# Patient Record
Sex: FEMALE | Race: WHITE | Employment: OTHER | URBAN - METROPOLITAN AREA
[De-identification: names, ages, dates, MRNs, and addresses within clinical notes are randomized per-mention and may not be internally consistent; named-entity substitution may affect disease eponyms.]

---

## 2021-02-09 PROBLEM — G57.02 PIRIFORMIS SYNDROME OF LEFT SIDE: Status: ACTIVE | Noted: 2021-02-09

## 2021-05-05 PROBLEM — M51.36 DDD (DEGENERATIVE DISC DISEASE), LUMBAR: Status: ACTIVE | Noted: 2021-05-05

## 2021-05-05 PROBLEM — M54.50 LUMBAR PAIN: Status: ACTIVE | Noted: 2021-05-05

## 2021-05-05 PROBLEM — M47.816 LUMBAR SPONDYLOSIS: Status: ACTIVE | Noted: 2021-05-05

## 2021-07-07 PROBLEM — M53.3 SACROILIAC JOINT DISEASE: Chronic | Status: ACTIVE | Noted: 2021-07-07

## 2022-09-26 PROBLEM — R25.2 BILATERAL LEG CRAMPS: Status: ACTIVE | Noted: 2022-09-26

## 2023-04-11 PROBLEM — I87.2 VENOUS REFLUX: Status: ACTIVE | Noted: 2023-04-11

## 2023-09-18 PROBLEM — N18.9 CHRONIC KIDNEY DISEASE: Chronic | Status: ACTIVE | Noted: 2023-09-18

## 2024-03-11 PROBLEM — I10 ESSENTIAL (PRIMARY) HYPERTENSION: Status: ACTIVE | Noted: 2023-03-21

## 2024-03-11 PROBLEM — I10 ESSENTIAL (PRIMARY) HYPERTENSION: Chronic | Status: ACTIVE | Noted: 2023-03-21

## 2024-05-11 ENCOUNTER — APPOINTMENT (OUTPATIENT)
Dept: GENERAL RADIOLOGY | Age: 89
DRG: 963 | End: 2024-05-11
Payer: MEDICARE

## 2024-05-11 ENCOUNTER — APPOINTMENT (OUTPATIENT)
Dept: CT IMAGING | Age: 89
DRG: 963 | End: 2024-05-11
Payer: MEDICARE

## 2024-05-11 ENCOUNTER — HOSPITAL ENCOUNTER (INPATIENT)
Age: 89
LOS: 4 days | Discharge: NURSING, REHAB OR LONG-TERM CARE | DRG: 963 | End: 2024-05-15
Attending: EMERGENCY MEDICINE | Admitting: SURGERY
Payer: MEDICARE

## 2024-05-11 DIAGNOSIS — S32.512A CLOSED FRACTURE OF SUPERIOR RAMUS OF LEFT PUBIS, INITIAL ENCOUNTER (HCC): ICD-10-CM
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
## 2024-05-11 DIAGNOSIS — S32.592A CLOSED FRACTURE OF LEFT INFERIOR PUBIC RAMUS, INITIAL ENCOUNTER (HCC): ICD-10-CM

## 2024-05-11 DIAGNOSIS — R79.89 ELEVATED TROPONIN: ICD-10-CM
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
## 2024-05-11 LAB
ALBUMIN SERPL-MCNC: 3.6 G/DL (ref 3.5–5.2)
ALBUMIN/GLOB SERPL: 2 {RATIO} (ref 1–2.5)
ALP SERPL-CCNC: 67 U/L (ref 35–104)
ALT SERPL-CCNC: 36 U/L (ref 10–35)
ANION GAP SERPL CALCULATED.3IONS-SCNC: 13 MMOL/L (ref 9–16)
APAP SERPL-MCNC: <5 UG/ML (ref 10–30)
AST SERPL-CCNC: 159 U/L (ref 10–35)
BACTERIA URNS QL MICRO: ABNORMAL
BASOPHILS # BLD: <0.03 K/UL (ref 0–0.2)
BASOPHILS NFR BLD: 0 % (ref 0–2)
BILIRUB SERPL-MCNC: 1.5 MG/DL (ref 0–1.2)
BILIRUB UR QL STRIP: NEGATIVE
BNP SERPL-MCNC: 1737 PG/ML (ref 0–300)
BUN BLD-MCNC: 57 MG/DL (ref 8–26)
BUN SERPL-MCNC: 54 MG/DL (ref 8–23)
CA-I BLD-SCNC: 1.23 MMOL/L (ref 1.13–1.33)
CA-I BLD-SCNC: 1.27 MMOL/L (ref 1.15–1.33)
CALCIUM SERPL-MCNC: 9.1 MG/DL (ref 8.6–10.4)
CASTS #/AREA URNS LPF: ABNORMAL /LPF (ref 0–8)
CHLORIDE BLD-SCNC: 108 MMOL/L (ref 98–107)
CHLORIDE SERPL-SCNC: 105 MMOL/L (ref 98–107)
CK SERPL-CCNC: 5058 U/L (ref 26–192)
CLARITY UR: ABNORMAL
CO2 BLD CALC-SCNC: 23 MMOL/L (ref 22–30)
CO2 SERPL-SCNC: 20 MMOL/L (ref 20–31)
COLOR UR: ABNORMAL
CORTIS SERPL-MCNC: 20.9 UG/DL (ref 2.5–19.5)
CREAT SERPL-MCNC: 1 MG/DL (ref 0.5–0.9)
EGFR, POC: 57 ML/MIN/1.73M2
EOSINOPHIL # BLD: 0.03 K/UL (ref 0–0.44)
EOSINOPHILS RELATIVE PERCENT: 0 % (ref 1–4)
EPI CELLS #/AREA URNS HPF: ABNORMAL /HPF (ref 0–5)
ERYTHROCYTE [DISTWIDTH] IN BLOOD BY AUTOMATED COUNT: 13.9 % (ref 11.8–14.4)
ETHANOL PERCENT: <0.01 %
ETHANOLAMINE SERPL-MCNC: <10 MG/DL
GFR, ESTIMATED: 49 ML/MIN/1.73M2
GLUCOSE BLD-MCNC: 111 MG/DL (ref 74–100)
GLUCOSE SERPL-MCNC: 111 MG/DL (ref 74–99)
GLUCOSE UR STRIP-MCNC: NEGATIVE MG/DL
HCO3 VENOUS: 23.5 MMOL/L (ref 22–29)
HCT VFR BLD AUTO: 24.1 % (ref 36.3–47.1)
HCT VFR BLD AUTO: 24.5 % (ref 36.3–47.1)
HCT VFR BLD AUTO: 26 % (ref 36–46)
HGB BLD-MCNC: 7.6 G/DL (ref 11.9–15.1)
HGB BLD-MCNC: 8.4 G/DL (ref 11.9–15.1)
HGB UR QL STRIP.AUTO: ABNORMAL
IMM GRANULOCYTES # BLD AUTO: 0.07 K/UL (ref 0–0.3)
IMM GRANULOCYTES NFR BLD: 1 %
INR PPP: 1.2
KETONES UR STRIP-MCNC: ABNORMAL MG/DL
LACTIC ACID, SEPSIS WHOLE BLOOD: 1.4 MMOL/L (ref 0.5–1.9)
LACTIC ACID, SEPSIS WHOLE BLOOD: 2 MMOL/L (ref 0.5–1.9)
LEUKOCYTE ESTERASE UR QL STRIP: ABNORMAL
LIPASE SERPL-CCNC: 13 U/L (ref 13–60)
LYMPHOCYTES NFR BLD: 0.93 K/UL (ref 1.1–3.7)
LYMPHOCYTES RELATIVE PERCENT: 8 % (ref 24–43)
MAGNESIUM SERPL-MCNC: 2.4 MG/DL (ref 1.7–2.3)
MCH RBC QN AUTO: 32.6 PG (ref 25.2–33.5)
MCHC RBC AUTO-ENTMCNC: 34.3 G/DL (ref 28.4–34.8)
MCV RBC AUTO: 95 FL (ref 82.6–102.9)
MONOCYTES NFR BLD: 1.12 K/UL (ref 0.1–1.2)
MONOCYTES NFR BLD: 10 % (ref 3–12)
MYOGLOBIN SERPL-MCNC: 4083 NG/ML (ref 25–58)
NEGATIVE BASE EXCESS, VEN: 2.3 MMOL/L (ref 0–2)
NEUTROPHILS NFR BLD: 81 % (ref 36–65)
NEUTS SEG NFR BLD: 9.61 K/UL (ref 1.5–8.1)
NITRITE UR QL STRIP: POSITIVE
NRBC BLD-RTO: 0 PER 100 WBC
O2 SAT, VEN: 39.2 % (ref 60–85)
PARTIAL THROMBOPLASTIN TIME: 33.6 SEC (ref 23–36.5)
PCO2, VEN: 44.2 MM HG (ref 41–51)
PH UR STRIP: 5 [PH] (ref 5–8)
PH VENOUS: 7.33 (ref 7.32–7.43)
PHOSPHATE SERPL-MCNC: 3.3 MG/DL (ref 2.5–4.5)
PLATELET # BLD AUTO: 111 K/UL (ref 138–453)
PMV BLD AUTO: 13 FL (ref 8.1–13.5)
PO2, VEN: 24.3 MM HG (ref 30–50)
POC ANION GAP: 8 MMOL/L (ref 7–16)
POC CREATININE: 0.9 MG/DL (ref 0.51–1.19)
POC HEMOGLOBIN (CALC): 8.8 G/DL (ref 12–16)
POC LACTIC ACID: 1.7 MMOL/L (ref 0.56–1.39)
POTASSIUM BLD-SCNC: 4.2 MMOL/L (ref 3.5–4.5)
POTASSIUM SERPL-SCNC: 4.3 MMOL/L (ref 3.7–5.3)
PROT SERPL-MCNC: 5.9 G/DL (ref 6.6–8.7)
PROT UR STRIP-MCNC: ABNORMAL MG/DL
PROTHROMBIN TIME: 14.8 SEC (ref 11.7–14.9)
RBC # BLD AUTO: 2.58 M/UL (ref 3.95–5.11)
RBC #/AREA URNS HPF: ABNORMAL /HPF (ref 0–4)
SALICYLATES SERPL-MCNC: <0.5 MG/DL (ref 0–10)
SODIUM BLD-SCNC: 138 MMOL/L (ref 138–146)
SODIUM SERPL-SCNC: 138 MMOL/L (ref 136–145)
SP GR UR STRIP: 1.02 (ref 1–1.03)
T4 FREE SERPL-MCNC: 1.6 NG/DL (ref 0.92–1.68)
TROPONIN I SERPL HS-MCNC: 47 NG/L (ref 0–14)
TROPONIN I SERPL HS-MCNC: 49 NG/L (ref 0–14)
TROPONIN I SERPL HS-MCNC: 80 NG/L (ref 0–14)
TSH SERPL DL<=0.05 MIU/L-ACNC: 8.27 UIU/ML (ref 0.27–4.2)
UROBILINOGEN UR STRIP-ACNC: NORMAL EU/DL (ref 0–1)
WBC #/AREA URNS HPF: ABNORMAL /HPF (ref 0–5)
WBC OTHER # BLD: 11.8 K/UL (ref 3.5–11.3)

## 2024-05-11 PROCEDURE — 2060000000 HC ICU INTERMEDIATE R&B

## 2024-05-11 PROCEDURE — 87040 BLOOD CULTURE FOR BACTERIA: CPT

## 2024-05-11 PROCEDURE — 83605 ASSAY OF LACTIC ACID: CPT

## 2024-05-11 PROCEDURE — 96375 TX/PRO/DX INJ NEW DRUG ADDON: CPT

## 2024-05-11 PROCEDURE — 81003 URINALYSIS AUTO W/O SCOPE: CPT

## 2024-05-11 PROCEDURE — 85610 PROTHROMBIN TIME: CPT

## 2024-05-11 PROCEDURE — 73110 X-RAY EXAM OF WRIST: CPT

## 2024-05-11 PROCEDURE — 84439 ASSAY OF FREE THYROXINE: CPT

## 2024-05-11 PROCEDURE — 83690 ASSAY OF LIPASE: CPT

## 2024-05-11 PROCEDURE — 99284 EMERGENCY DEPT VISIT MOD MDM: CPT | Performed by: SURGERY

## 2024-05-11 PROCEDURE — 96365 THER/PROPH/DIAG IV INF INIT: CPT

## 2024-05-11 PROCEDURE — 82330 ASSAY OF CALCIUM: CPT

## 2024-05-11 PROCEDURE — 80051 ELECTROLYTE PANEL: CPT

## 2024-05-11 PROCEDURE — 82533 TOTAL CORTISOL: CPT

## 2024-05-11 PROCEDURE — 2720000011 HC SANE KIT SUPPLY STERILE

## 2024-05-11 PROCEDURE — 73080 X-RAY EXAM OF ELBOW: CPT

## 2024-05-11 PROCEDURE — 84100 ASSAY OF PHOSPHORUS: CPT

## 2024-05-11 PROCEDURE — 99285 EMERGENCY DEPT VISIT HI MDM: CPT

## 2024-05-11 PROCEDURE — 85014 HEMATOCRIT: CPT

## 2024-05-11 PROCEDURE — 84443 ASSAY THYROID STIM HORMONE: CPT

## 2024-05-11 PROCEDURE — 85025 COMPLETE CBC W/AUTO DIFF WBC: CPT

## 2024-05-11 PROCEDURE — 6360000004 HC RX CONTRAST MEDICATION: Performed by: STUDENT IN AN ORGANIZED HEALTH CARE EDUCATION/TRAINING PROGRAM

## 2024-05-11 PROCEDURE — 73562 X-RAY EXAM OF KNEE 3: CPT

## 2024-05-11 PROCEDURE — 84484 ASSAY OF TROPONIN QUANT: CPT

## 2024-05-11 PROCEDURE — 82803 BLOOD GASES ANY COMBINATION: CPT

## 2024-05-11 PROCEDURE — 83874 ASSAY OF MYOGLOBIN: CPT

## 2024-05-11 PROCEDURE — 85018 HEMOGLOBIN: CPT

## 2024-05-11 PROCEDURE — 93005 ELECTROCARDIOGRAM TRACING: CPT | Performed by: STUDENT IN AN ORGANIZED HEALTH CARE EDUCATION/TRAINING PROGRAM

## 2024-05-11 PROCEDURE — 85730 THROMBOPLASTIN TIME PARTIAL: CPT

## 2024-05-11 PROCEDURE — 81001 URINALYSIS AUTO W/SCOPE: CPT

## 2024-05-11 PROCEDURE — 96367 TX/PROPH/DG ADDL SEQ IV INF: CPT

## 2024-05-11 PROCEDURE — 87088 URINE BACTERIA CULTURE: CPT

## 2024-05-11 PROCEDURE — 70450 CT HEAD/BRAIN W/O DYE: CPT

## 2024-05-11 PROCEDURE — 80179 DRUG ASSAY SALICYLATE: CPT

## 2024-05-11 PROCEDURE — 80143 DRUG ASSAY ACETAMINOPHEN: CPT

## 2024-05-11 PROCEDURE — 84520 ASSAY OF UREA NITROGEN: CPT

## 2024-05-11 PROCEDURE — 93005 ELECTROCARDIOGRAM TRACING: CPT

## 2024-05-11 PROCEDURE — 71045 X-RAY EXAM CHEST 1 VIEW: CPT

## 2024-05-11 PROCEDURE — 2580000003 HC RX 258: Performed by: STUDENT IN AN ORGANIZED HEALTH CARE EDUCATION/TRAINING PROGRAM

## 2024-05-11 PROCEDURE — 80053 COMPREHEN METABOLIC PANEL: CPT

## 2024-05-11 PROCEDURE — 73610 X-RAY EXAM OF ANKLE: CPT

## 2024-05-11 PROCEDURE — 36415 COLL VENOUS BLD VENIPUNCTURE: CPT

## 2024-05-11 PROCEDURE — 2580000003 HC RX 258

## 2024-05-11 PROCEDURE — G0480 DRUG TEST DEF 1-7 CLASSES: HCPCS

## 2024-05-11 PROCEDURE — 87186 SC STD MICRODIL/AGAR DIL: CPT

## 2024-05-11 PROCEDURE — 73030 X-RAY EXAM OF SHOULDER: CPT

## 2024-05-11 PROCEDURE — 83735 ASSAY OF MAGNESIUM: CPT

## 2024-05-11 PROCEDURE — 6360000002 HC RX W HCPCS: Performed by: STUDENT IN AN ORGANIZED HEALTH CARE EDUCATION/TRAINING PROGRAM

## 2024-05-11 PROCEDURE — 72125 CT NECK SPINE W/O DYE: CPT

## 2024-05-11 PROCEDURE — 82565 ASSAY OF CREATININE: CPT

## 2024-05-11 PROCEDURE — 82550 ASSAY OF CK (CPK): CPT

## 2024-05-11 PROCEDURE — 71260 CT THORAX DX C+: CPT

## 2024-05-11 PROCEDURE — 83880 ASSAY OF NATRIURETIC PEPTIDE: CPT

## 2024-05-11 PROCEDURE — 73060 X-RAY EXAM OF HUMERUS: CPT

## 2024-05-11 PROCEDURE — 82947 ASSAY GLUCOSE BLOOD QUANT: CPT

## 2024-05-11 PROCEDURE — 72190 X-RAY EXAM OF PELVIS: CPT

## 2024-05-11 PROCEDURE — 87086 URINE CULTURE/COLONY COUNT: CPT

## 2024-05-11 RX ORDER — ACETAMINOPHEN 500 MG
1000 TABLET ORAL EVERY 8 HOURS
Status: DISCONTINUED | OUTPATIENT
Start: 2024-05-11 | End: 2024-05-15 | Stop reason: HOSPADM

## 2024-05-11 RX ORDER — LOSARTAN POTASSIUM 50 MG/1
50 TABLET ORAL DAILY
Status: DISCONTINUED | OUTPATIENT
Start: 2024-05-11 | End: 2024-05-15 | Stop reason: HOSPADM

## 2024-05-11 RX ORDER — ONDANSETRON 4 MG/1
4 TABLET, ORALLY DISINTEGRATING ORAL EVERY 8 HOURS PRN
Status: DISCONTINUED | OUTPATIENT
Start: 2024-05-11 | End: 2024-05-15 | Stop reason: HOSPADM

## 2024-05-11 RX ORDER — SODIUM CHLORIDE 0.9 % (FLUSH) 0.9 %
5-40 SYRINGE (ML) INJECTION PRN
Status: DISCONTINUED | OUTPATIENT
Start: 2024-05-11 | End: 2024-05-15 | Stop reason: HOSPADM

## 2024-05-11 RX ORDER — OXYCODONE HYDROCHLORIDE 5 MG/1
5 TABLET ORAL EVERY 6 HOURS PRN
Status: DISCONTINUED | OUTPATIENT
Start: 2024-05-11 | End: 2024-05-11

## 2024-05-11 RX ORDER — SODIUM CHLORIDE 0.9 % (FLUSH) 0.9 %
5-40 SYRINGE (ML) INJECTION EVERY 12 HOURS SCHEDULED
Status: DISCONTINUED | OUTPATIENT
Start: 2024-05-11 | End: 2024-05-15 | Stop reason: HOSPADM

## 2024-05-11 RX ORDER — OXYCODONE HYDROCHLORIDE 5 MG/1
2.5 TABLET ORAL EVERY 6 HOURS PRN
Status: DISCONTINUED | OUTPATIENT
Start: 2024-05-11 | End: 2024-05-15 | Stop reason: HOSPADM

## 2024-05-11 RX ORDER — SODIUM CHLORIDE 9 MG/ML
INJECTION, SOLUTION INTRAVENOUS PRN
Status: DISCONTINUED | OUTPATIENT
Start: 2024-05-11 | End: 2024-05-15 | Stop reason: HOSPADM

## 2024-05-11 RX ORDER — METHOCARBAMOL 500 MG/1
500 TABLET, FILM COATED ORAL EVERY 8 HOURS
Status: DISCONTINUED | OUTPATIENT
Start: 2024-05-11 | End: 2024-05-11

## 2024-05-11 RX ORDER — GABAPENTIN 100 MG/1
100 CAPSULE ORAL EVERY 8 HOURS
Status: DISCONTINUED | OUTPATIENT
Start: 2024-05-11 | End: 2024-05-11

## 2024-05-11 RX ORDER — POLYETHYLENE GLYCOL 3350 17 G/17G
17 POWDER, FOR SOLUTION ORAL DAILY
Status: DISCONTINUED | OUTPATIENT
Start: 2024-05-11 | End: 2024-05-15 | Stop reason: HOSPADM

## 2024-05-11 RX ORDER — SPIRONOLACTONE 25 MG/1
12.5 TABLET ORAL DAILY
Status: DISCONTINUED | OUTPATIENT
Start: 2024-05-12 | End: 2024-05-15 | Stop reason: HOSPADM

## 2024-05-11 RX ORDER — LEVOTHYROXINE SODIUM 0.05 MG/1
50 TABLET ORAL DAILY
Status: DISCONTINUED | OUTPATIENT
Start: 2024-05-11 | End: 2024-05-15 | Stop reason: HOSPADM

## 2024-05-11 RX ORDER — SPIRONOLACTONE 25 MG/1
12.5 TABLET ORAL DAILY
Status: DISCONTINUED | OUTPATIENT
Start: 2024-05-11 | End: 2024-05-11

## 2024-05-11 RX ORDER — ONDANSETRON 2 MG/ML
4 INJECTION INTRAMUSCULAR; INTRAVENOUS EVERY 6 HOURS PRN
Status: DISCONTINUED | OUTPATIENT
Start: 2024-05-11 | End: 2024-05-15 | Stop reason: HOSPADM

## 2024-05-11 RX ORDER — 0.9 % SODIUM CHLORIDE 0.9 %
1000 INTRAVENOUS SOLUTION INTRAVENOUS ONCE
Status: COMPLETED | OUTPATIENT
Start: 2024-05-11 | End: 2024-05-11

## 2024-05-11 RX ORDER — MORPHINE SULFATE 4 MG/ML
2 INJECTION, SOLUTION INTRAMUSCULAR; INTRAVENOUS ONCE
Status: COMPLETED | OUTPATIENT
Start: 2024-05-11 | End: 2024-05-11

## 2024-05-11 RX ORDER — SODIUM CHLORIDE, SODIUM LACTATE, POTASSIUM CHLORIDE, CALCIUM CHLORIDE 600; 310; 30; 20 MG/100ML; MG/100ML; MG/100ML; MG/100ML
INJECTION, SOLUTION INTRAVENOUS CONTINUOUS
Status: DISCONTINUED | OUTPATIENT
Start: 2024-05-11 | End: 2024-05-15

## 2024-05-11 RX ADMIN — IOPAMIDOL 75 ML: 755 INJECTION, SOLUTION INTRAVENOUS at 14:09

## 2024-05-11 RX ADMIN — SODIUM CHLORIDE 1000 ML: 9 INJECTION, SOLUTION INTRAVENOUS at 13:04

## 2024-05-11 RX ADMIN — PIPERACILLIN AND TAZOBACTAM 3375 MG: 3; .375 INJECTION, POWDER, LYOPHILIZED, FOR SOLUTION INTRAVENOUS at 13:56

## 2024-05-11 RX ADMIN — MORPHINE SULFATE 2 MG: 4 INJECTION INTRAVENOUS at 13:09

## 2024-05-11 RX ADMIN — VANCOMYCIN HYDROCHLORIDE 1000 MG: 1 INJECTION, POWDER, LYOPHILIZED, FOR SOLUTION INTRAVENOUS at 15:02

## 2024-05-11 RX ADMIN — SODIUM CHLORIDE, POTASSIUM CHLORIDE, SODIUM LACTATE AND CALCIUM CHLORIDE: 600; 310; 30; 20 INJECTION, SOLUTION INTRAVENOUS at 19:03

## 2024-05-11 ASSESSMENT — PAIN SCALES - GENERAL
PAINLEVEL_OUTOF10: 5
PAINLEVEL_OUTOF10: 6

## 2024-05-11 ASSESSMENT — PAIN DESCRIPTION - LOCATION: LOCATION: BACK

## 2024-05-11 ASSESSMENT — PAIN DESCRIPTION - ORIENTATION: ORIENTATION: LOWER

## 2024-05-11 ASSESSMENT — PAIN - FUNCTIONAL ASSESSMENT: PAIN_FUNCTIONAL_ASSESSMENT: 0-10

## 2024-05-11 ASSESSMENT — PAIN DESCRIPTION - DESCRIPTORS: DESCRIPTORS: ACHING
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
## 2024-05-11 NOTE — PROGRESS NOTES
[facility]     Emergency/Trauma Note    PATIENT NAME: Genie Wisdom    Shift date: 5/11/2024  Shift day: Saturday   Shift # 2    Room # 0434/0434-01   Name: Genie Wisdom            Age: 89 y.o.  Gender: female          Adventist: Mu-ism   Place of [place]:     Trauma/Incident type: Adult Trauma Consult  Admit Date & Time: 5/11/2024 12:37 PM  TRAUMA NAME:     ADVANCE DIRECTIVES IN CHART?  No    NAME OF DECISION MAKER: N/A    RELATIONSHIP OF DECISION MAKER TO PATIENT: N/A    PATIENT/EVENT DESCRIPTION:  Genie Wisdom is a 89 y.o. female who arrived at [facility]. Writer responded to trauma consult to ED 34. Pt to be admitted to 0434/0434-01.         SPIRITUAL ASSESSMENT-INTERVENTION-OUTCOME:   was a ministry of presence to medical staff of several attempts to room. Upon returning, writer introduced self as . Patient did not appear to mind  presence and engaged in conversation about her health.  provided a supportive presence through active listening and words of affirmation. Patient stated family is aware she is on campus.      PATIENT BELONGINGS:  Writer did not handle patient belongings    ANY BELONGINGS OF SIGNIFICANT VALUE NOTED:  N/a    REGISTRATION STAFF NOTIFIED?  Yes      WHAT IS YOUR SPIRITUAL CARE PLAN FOR THIS PATIENT?:   Chaplains will remain available to offer spiritual and emotional support as needed.    Electronically signed by Chaplain Omid, on 5/11/2024 at 7:09 PM.  [facility]  [phone]

## 2024-05-11 NOTE — PROGRESS NOTES
15 Variable Trauma-Specific Frailty Index   Comorbidities   Cancer History Yes (1) No (0)   Coronary Heart Disease MI (1) CABG (0.75) Mild (0.25)  No (0)   Dementia Severe (1) Moderate (0.5) Mild (0.25)  No (0)   Daily Activities   Help With Grooming Yes (1) No (0)   Help with Managing Money Yes (1) No (0)   Help doing Housework Yes (1) No (0)   Help with Toileting Yes (1) No (0)   Help Walking Wheelchair (1) Walker (0.75) Cane (0.5) No (0)   Health Attitude   Feel Less Useful Most Time (1) Sometimes (0.5) Never (0)   Feel Sad Most Time (1) Sometimes (0.5) Never (0)   Feel Effort to Do Everything Most Time (1) Sometimes (0.5) Never (0)   Feels Lonely Most Time (1) Sometimes (0.5) Never (0)   Falls Most Time (1) Sometimes (0.5) Never (0)   Function   Sexually Active Yes (0)  No(1)   Nutrition   Albumin < 3g/dL (1)  > 3g/dL   Scoring   Score  4.75 FI (Score/15)   > 0.25 = Frail   *Based on 2-weeks prior to hospital admission   Trauma Specific Fraility Index > or = to 4 (4/15 = 0.26)  Trauma Specific Fraility Index Score:    Frail
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
## 2024-05-11 NOTE — ED NOTES
Admitting team paged and notified patient failed swallow study done by Amber STEVE. Awaiting order changes, will not give oral medications d/t fail
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
## 2024-05-11 NOTE — ED PROVIDER NOTES
Faculty Sign-Out Attestation  Handoff taken on the following patient from prior Attending Physician: Cheli    Note Started: 3:32 PM EDT    I was available and discussed any additional care issues that arose and coordinated the management plans with the resident(s) caring for the patient during my duty period. Any areas of disagreement with resident’s documentation of care or procedures are noted on the chart. I was personally present for the key portions of any/all procedures during my duty period. I have documented in the chart those procedures where I was not present during the key portions.    CT HEAD WO CONTRAST   Preliminary Result   CT HEAD: No evidence for acute intracranial pathology.      Findings likely reflecting chronic small vessel ischemic change.      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      CT CERVICAL SPINE: No acute abnormality of the cervical spine.      Multilevel degenerative changes.         CT CERVICAL SPINE WO CONTRAST   Preliminary Result   CT HEAD: No evidence for acute intracranial pathology.      Findings likely reflecting chronic small vessel ischemic change.      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      CT CERVICAL SPINE: No acute abnormality of the cervical spine.      Multilevel degenerative changes.         CT CHEST ABDOMEN PELVIS W CONTRAST Additional Contrast? None   Final Result   1. Acute fractures of the left superior and inferior pubic rami with an   associated 8.7 x 4.1 cm hematoma along the left acetabulum.   2. No evidence of traumatic injury in the chest.   3. 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   4. Large amount of stool in the rectum. Correlate for impaction.   5. No solid or hollow organ injury in the abdomen/pelvis.         CT THORACIC SPINE BONY RECONSTRUCTION   Preliminary Result   No evidence for acute fracture to the thoracic
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
## 2024-05-11 NOTE — ED NOTES
Unable to obtain accurate pulse ox on arrival pt core temp 32.7. Visible bruising to left arm and back. MD notified.
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
## 2024-05-12 LAB
25(OH)D3 SERPL-MCNC: 11.8 NG/ML (ref 30–100)
ANION GAP SERPL CALCULATED.3IONS-SCNC: 9 MMOL/L (ref 9–16)
BASOPHILS # BLD: <0.03 K/UL (ref 0–0.2)
BASOPHILS NFR BLD: 0 % (ref 0–2)
BUN SERPL-MCNC: 48 MG/DL (ref 8–23)
CALCIUM SERPL-MCNC: 8.4 MG/DL (ref 8.6–10.4)
CHLORIDE SERPL-SCNC: 110 MMOL/L (ref 98–107)
CK SERPL-CCNC: 2588 U/L (ref 26–192)
CK SERPL-CCNC: 4138 U/L (ref 26–192)
CO2 SERPL-SCNC: 19 MMOL/L (ref 20–31)
CREAT SERPL-MCNC: 1.1 MG/DL (ref 0.5–0.9)
EOSINOPHIL # BLD: 0.06 K/UL (ref 0–0.44)
EOSINOPHILS RELATIVE PERCENT: 1 % (ref 1–4)
ERYTHROCYTE [DISTWIDTH] IN BLOOD BY AUTOMATED COUNT: 14.6 % (ref 11.8–14.4)
GFR, ESTIMATED: 45 ML/MIN/1.73M2
GLUCOSE SERPL-MCNC: 95 MG/DL (ref 74–99)
HCT VFR BLD AUTO: 25.1 % (ref 36.3–47.1)
HGB BLD-MCNC: 7.9 G/DL (ref 11.9–15.1)
IMM GRANULOCYTES # BLD AUTO: 0.09 K/UL (ref 0–0.3)
IMM GRANULOCYTES NFR BLD: 1 %
LYMPHOCYTES NFR BLD: 1.01 K/UL (ref 1.1–3.7)
LYMPHOCYTES RELATIVE PERCENT: 12 % (ref 24–43)
MCH RBC QN AUTO: 31.9 PG (ref 25.2–33.5)
MCHC RBC AUTO-ENTMCNC: 31.5 G/DL (ref 28.4–34.8)
MCV RBC AUTO: 101.2 FL (ref 82.6–102.9)
MICROORGANISM SPEC CULT: ABNORMAL
MONOCYTES NFR BLD: 0.99 K/UL (ref 0.1–1.2)
MONOCYTES NFR BLD: 11 % (ref 3–12)
MYOGLOBIN SERPL-MCNC: 2213 NG/ML (ref 25–58)
MYOGLOBIN SERPL-MCNC: 868 NG/ML (ref 25–58)
NEUTROPHILS NFR BLD: 75 % (ref 36–65)
NEUTS SEG NFR BLD: 6.64 K/UL (ref 1.5–8.1)
NRBC BLD-RTO: 0 PER 100 WBC
PLATELET # BLD AUTO: 113 K/UL (ref 138–453)
PMV BLD AUTO: 12.5 FL (ref 8.1–13.5)
POTASSIUM SERPL-SCNC: 4.2 MMOL/L (ref 3.7–5.3)
RBC # BLD AUTO: 2.48 M/UL (ref 3.95–5.11)
RBC # BLD: ABNORMAL 10*6/UL
SODIUM SERPL-SCNC: 138 MMOL/L (ref 136–145)
SPECIMEN DESCRIPTION: ABNORMAL
TROPONIN I SERPL HS-MCNC: 124 NG/L (ref 0–14)
TROPONIN I SERPL HS-MCNC: 124 NG/L (ref 0–14)
TROPONIN I SERPL HS-MCNC: 83 NG/L (ref 0–14)
TROPONIN I SERPL HS-MCNC: 95 NG/L (ref 0–14)
WBC OTHER # BLD: 8.8 K/UL (ref 3.5–11.3)

## 2024-05-12 PROCEDURE — 97162 PT EVAL MOD COMPLEX 30 MIN: CPT

## 2024-05-12 PROCEDURE — 6370000000 HC RX 637 (ALT 250 FOR IP): Performed by: NURSE PRACTITIONER

## 2024-05-12 PROCEDURE — 2580000003 HC RX 258

## 2024-05-12 PROCEDURE — 97167 OT EVAL HIGH COMPLEX 60 MIN: CPT

## 2024-05-12 PROCEDURE — 97530 THERAPEUTIC ACTIVITIES: CPT

## 2024-05-12 PROCEDURE — 83874 ASSAY OF MYOGLOBIN: CPT

## 2024-05-12 PROCEDURE — 84484 ASSAY OF TROPONIN QUANT: CPT

## 2024-05-12 PROCEDURE — 2060000000 HC ICU INTERMEDIATE R&B

## 2024-05-12 PROCEDURE — 36415 COLL VENOUS BLD VENIPUNCTURE: CPT

## 2024-05-12 PROCEDURE — 82550 ASSAY OF CK (CPK): CPT

## 2024-05-12 PROCEDURE — 80048 BASIC METABOLIC PNL TOTAL CA: CPT

## 2024-05-12 PROCEDURE — 6360000002 HC RX W HCPCS: Performed by: NURSE PRACTITIONER

## 2024-05-12 PROCEDURE — 2580000003 HC RX 258: Performed by: STUDENT IN AN ORGANIZED HEALTH CARE EDUCATION/TRAINING PROGRAM

## 2024-05-12 PROCEDURE — 82306 VITAMIN D 25 HYDROXY: CPT

## 2024-05-12 PROCEDURE — 99223 1ST HOSP IP/OBS HIGH 75: CPT | Performed by: NURSE PRACTITIONER

## 2024-05-12 PROCEDURE — 85025 COMPLETE CBC W/AUTO DIFF WBC: CPT

## 2024-05-12 PROCEDURE — 92610 EVALUATE SWALLOWING FUNCTION: CPT

## 2024-05-12 PROCEDURE — 6370000000 HC RX 637 (ALT 250 FOR IP)

## 2024-05-12 PROCEDURE — 6360000002 HC RX W HCPCS: Performed by: STUDENT IN AN ORGANIZED HEALTH CARE EDUCATION/TRAINING PROGRAM

## 2024-05-12 PROCEDURE — 99231 SBSQ HOSP IP/OBS SF/LOW 25: CPT | Performed by: SURGERY

## 2024-05-12 RX ORDER — ERGOCALCIFEROL 1.25 MG/1
50000 CAPSULE ORAL WEEKLY
Status: DISCONTINUED | OUTPATIENT
Start: 2024-05-12 | End: 2024-05-15 | Stop reason: HOSPADM

## 2024-05-12 RX ORDER — HEPARIN SODIUM 5000 [USP'U]/ML
5000 INJECTION, SOLUTION INTRAVENOUS; SUBCUTANEOUS EVERY 8 HOURS SCHEDULED
Status: DISCONTINUED | OUTPATIENT
Start: 2024-05-12 | End: 2024-05-15 | Stop reason: HOSPADM

## 2024-05-12 RX ORDER — SODIUM CHLORIDE, SODIUM LACTATE, POTASSIUM CHLORIDE, AND CALCIUM CHLORIDE .6; .31; .03; .02 G/100ML; G/100ML; G/100ML; G/100ML
500 INJECTION, SOLUTION INTRAVENOUS ONCE
Status: COMPLETED | OUTPATIENT
Start: 2024-05-12 | End: 2024-05-12

## 2024-05-12 RX ADMIN — SODIUM CHLORIDE, PRESERVATIVE FREE 10 ML: 5 INJECTION INTRAVENOUS at 08:48

## 2024-05-12 RX ADMIN — ACETAMINOPHEN 1000 MG: 500 TABLET ORAL at 16:00

## 2024-05-12 RX ADMIN — ERGOCALCIFEROL 50000 UNITS: 1.25 CAPSULE ORAL at 16:00

## 2024-05-12 RX ADMIN — Medication 1000 MG: at 13:56

## 2024-05-12 RX ADMIN — SODIUM CHLORIDE, POTASSIUM CHLORIDE, SODIUM LACTATE AND CALCIUM CHLORIDE: 600; 310; 30; 20 INJECTION, SOLUTION INTRAVENOUS at 02:54

## 2024-05-12 RX ADMIN — ACETAMINOPHEN 1000 MG: 500 TABLET ORAL at 08:30

## 2024-05-12 RX ADMIN — LEVOTHYROXINE SODIUM 50 MCG: 50 TABLET ORAL at 08:47

## 2024-05-12 RX ADMIN — SODIUM CHLORIDE, POTASSIUM CHLORIDE, SODIUM LACTATE AND CALCIUM CHLORIDE: 600; 310; 30; 20 INJECTION, SOLUTION INTRAVENOUS at 10:52

## 2024-05-12 RX ADMIN — SODIUM CHLORIDE, POTASSIUM CHLORIDE, SODIUM LACTATE AND CALCIUM CHLORIDE 500 ML: 600; 310; 30; 20 INJECTION, SOLUTION INTRAVENOUS at 21:30

## 2024-05-12 RX ADMIN — ACETAMINOPHEN 1000 MG: 500 TABLET ORAL at 08:47

## 2024-05-12 RX ADMIN — HEPARIN SODIUM 5000 UNITS: 5000 INJECTION INTRAVENOUS; SUBCUTANEOUS at 21:33

## 2024-05-12 RX ADMIN — LEVOTHYROXINE SODIUM 50 MCG: 50 TABLET ORAL at 08:31

## 2024-05-12 NOTE — PROGRESS NOTES
PROGRESS NOTE    PATIENT NAME: Genie Wisdom  MEDICAL RECORD NO. [id number]  DATE: 5/12/2024    HD: # 1      DIAGNOSIS AND PLAN    Left superior and inferior pubic rami fractures with associated 8.7 x 4.1 cm hematoma along the left acetabulum   Orthopedic surgery following - non-operative management   Protected weightbearing as tolerated with walker   Post mobilization films    Monitor hemoglobin   Elevated troponin   Continue to monitor   Cardiology consulted - recommended heparin drip but held at this time due to pelvic hematoma   + Frailty - follow up geriatric medicine recommendations  MMPT   Maintenance IVF   Monitor intake and output   PT/OT       Chief Complaint: \"I suppose I am okay\"    SUBJECTIVE  Patient seen and examined at bedside.  No acute events overnight.  VSS, afebrile. Pain currently controlled.     OBJECTIVE  VITALS:   Vitals:    05/12/24 0725   BP: 117/73   Pulse: (!) 101   Resp: 14   Temp: 97.9 °F (36.6 °C)   SpO2: 100%     Physical Exam  Constitutional:       General: She is not in acute distress.  HENT:      Head: Normocephalic and atraumatic.      Right Ear: External ear normal.      Mouth/Throat:      Mouth: Mucous membranes are moist.   Eyes:      Extraocular Movements: Extraocular movements intact.   Cardiovascular:      Rate and Rhythm: Normal rate.   Pulmonary:      Effort: Pulmonary effort is normal. No respiratory distress.   Abdominal:      General: There is no distension.      Palpations: Abdomen is soft.      Tenderness: There is no abdominal tenderness.   Musculoskeletal:      Cervical back: Neck supple.   Skin:     General: Skin is warm and dry.      Findings: Bruising (bilateral lower extremities) present.   Neurological:      Mental Status: She is alert.   Psychiatric:         Mood and Affect: Mood normal.         Behavior: Behavior normal.           LAB:  CBC:   Recent Labs     05/11/24  1306 05/11/24  2253 05/12/24  0417   WBC 11.8*  --  8.8   HGB 8.4* 7.6* 7.9*   HCT 24.5*  lesions.  No bony erosions or bony destructive changes. Mild degenerative changes to the medial compartment.  No knee joint effusion. No acute soft tissue abnormalities. LEFT KNEE: Osteopenia. No fractures or dislocations.  No suspicious focal bony lesions.  No bony erosions or bony destructive changes. Mild degenerative changes to the medial compartment.  No knee joint effusion. No acute soft tissue abnormalities. Varicosities to the posterior distal left thigh. RIGHT ANKLE: Overlying items external to the patient somewhat limit evaluation. Osteopenia. No fractures or dislocations.  No suspicious focal bony lesions.  No bony erosions or bony destructive changes. No degenerative changes noted.  Ankle mortise relationships are maintained. No acute soft tissue abnormalities. LEFT ANKLE: Overlying items external to the patient somewhat limit evaluation. Osteopenia. No fractures or dislocations.  No suspicious focal bony lesions.  No bony erosions or bony destructive changes. No degenerative changes noted.  Ankle mortise relationships are maintained. No acute soft tissue abnormalities.     Studies of the left elbow, left wrist, both knees and both ankles demonstrate no acute abnormality.     XR WRIST LEFT (MIN 3 VIEWS)    Result Date: 5/11/2024  EXAMINATION: THREE X-RAY VIEWS OF THE LEFT ELBOW; THREE X-RAY VIEWS OF THE LEFT KNEE; THREE X-RAY VIEWS OF THE RIGHT KNEE; THREE X-RAY VIEWS OF THE RIGHT ANKLE; THREE X-RAY VIEWS OF THE LEFT ANKLE; THREE X-RAY VIEWS OF THE LEFT WRIST 5/11/2024 1:41 pm COMPARISON: None HISTORY: ORDERING SYSTEM PROVIDED HISTORY:  Fall, down for 3 days TECHNOLOGIST PROVIDED HISTORY: Fall, down for 3 days Reason for Exam:  Fall FINDINGS: LEFT ELBOW: Overlying items external to the patient somewhat limit evaluation. Suboptimal positioning on lateral projection. Osteopenia No fractures or dislocations.  No suspicious focal bony lesions.  No bony erosions or bony destructive changes. No degenerative
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
## 2024-05-12 NOTE — PROGRESS NOTES
Occupational Therapy  Facility/Department: [address] ONC/MED SURG  Occupational Therapy Initial Assessment    Name: Genie Wisdom  : 1923  MRN: [id number]  Date of Service: 2024    Chief Complaint   Patient presents with    Fall     Discharge Recommendations:  Patient would benefit from continued therapy after discharge  OT Equipment Recommendations  Equipment Needed: Yes  Mobility Devices: ADL Assistive Devices  ADL Assistive Devices: Sock-Aid Hard;Long-handled Shoe Horn;Long-handled Sponge;Dressing Stick;Reacher    Patient Diagnosis(es): The primary encounter diagnosis was Fall, initial encounter. Diagnoses of Closed fracture of left inferior pubic ramus, initial encounter (HCC) and Closed fracture of superior ramus of left pubis, initial encounter (HCC) were also pertinent to this visit.  Past Medical History:  has a past medical history of Dizziness, Dizzy spells, HTN (hypertension), Hyponatremia, and Hypothyroidism.  Past Surgical History:  has a past surgical history that includes Cataract removal (Bilateral, ); other surgical history (Left, 08/10/2015); other surgical history (Right, ); keratoplasty (Right, 10/2000); Appendectomy; Wrist ganglion excision; Pain management procedure (N/A, 2021); and Back Injection (Left, 2021).    Assessment   Performance deficits / Impairments: Decreased functional mobility ;Decreased ADL status;Decreased ROM;Decreased strength;Decreased cognition;Decreased safe awareness;Decreased high-level IADLs;Decreased endurance;Decreased coordination;Decreased posture  Prognosis: Fair  Decision Making: High Complexity  REQUIRES OT FOLLOW-UP: Yes  Activity Tolerance  Activity Tolerance: Treatment limited secondary to decreased cognition;Patient limited by fatigue        Plan   Occupational Therapy Plan  Times Per Week: 3-4 x/week     Restrictions  Restrictions/Precautions  Restrictions/Precautions: General Precautions, Fall Risk, Weight Bearing  Required Braces or  and BLE progression, pt sat EOB and completed MMT/ROM requiring assist to maintain sitting upright d/t fatigue and weakness, pt transferred sit>supine requiring assist for trunk control and BLE progression, pt retired supine in bed with bed alarm.    Vision  Vision: Impaired  Vision Exceptions: Wears glasses at all times  Hearing  Hearing: Exceptions to WFL  Hearing Exceptions: Hard of hearing/hearing concerns;Left hearing aid  Cognition  Overall Cognitive Status: Exceptions  Arousal/Alertness: Delayed responses to stimuli  Following Commands: Inconsistently follows commands  Attention Span: Attends with cues to redirect;Difficulty attending to directions  Memory: Impaired  Safety Judgement: Decreased awareness of need for assistance;Decreased awareness of need for safety  Problem Solving: Decreased awareness of errors;Assistance required to implement solutions;Assistance required to identify errors made;Assistance required to generate solutions;Assistance required to correct errors made  Insights: Decreased awareness of deficits  Initiation: Requires cues for all  Sequencing: Requires cues for all  Cognition Comment: When pt was asked who assists them to get around the community, pt stated \"my mom helps me\".  Orientation  Overall Orientation Status: Impaired  Orientation Level: Oriented to time;Oriented to person;Disoriented to place;Disoriented to situation    Education Given To: Patient  Education Provided: Role of Therapy;Plan of Care  Education Method: Verbal  Barriers to Learning: Cognition  Education Outcome: Unable to verbalize;Unable to demonstrate understanding;Continued education needed    AM-PAC - ADL  AM-PAC Daily Activity - Inpatient   How much help is needed for putting on and taking off regular lower body clothing?: A Lot  How much help is needed for bathing (which includes washing, rinsing, drying)?: A Lot  How much help is needed for toileting (which includes using toilet, bedpan, or urinal)?: A
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
## 2024-05-13 ENCOUNTER — APPOINTMENT (OUTPATIENT)
Dept: GENERAL RADIOLOGY | Age: 89
DRG: 963 | End: 2024-05-13
Payer: MEDICARE

## 2024-05-13 PROBLEM — I21.4 NSTEMI (NON-ST ELEVATED MYOCARDIAL INFARCTION) (HCC): Status: ACTIVE | Noted: 2024-05-13

## 2024-05-13 LAB
ALBUMIN SERPL-MCNC: 3.1 G/DL (ref 3.5–5.2)
ALBUMIN/GLOB SERPL: 1 {RATIO} (ref 1–2.5)
ALP SERPL-CCNC: 67 U/L (ref 35–104)
ALT SERPL-CCNC: 33 U/L (ref 10–35)
ANION GAP SERPL CALCULATED.3IONS-SCNC: 9 MMOL/L (ref 9–16)
AST SERPL-CCNC: 121 U/L (ref 10–35)
BASOPHILS # BLD: 0.05 K/UL (ref 0–0.2)
BASOPHILS NFR BLD: 1 % (ref 0–2)
BILIRUB DIRECT SERPL-MCNC: 0.5 MG/DL (ref 0–0.3)
BILIRUB INDIRECT SERPL-MCNC: 0.8 MG/DL (ref 0–1)
BILIRUB SERPL-MCNC: 1.2 MG/DL (ref 0–1.2)
BUN SERPL-MCNC: 37 MG/DL (ref 8–23)
CALCIUM SERPL-MCNC: 8.3 MG/DL (ref 8.6–10.4)
CHLORIDE SERPL-SCNC: 107 MMOL/L (ref 98–107)
CK SERPL-CCNC: 1810 U/L (ref 26–192)
CK SERPL-CCNC: 1987 U/L (ref 26–192)
CK SERPL-CCNC: 3565 U/L (ref 26–192)
CO2 SERPL-SCNC: 21 MMOL/L (ref 20–31)
CREAT SERPL-MCNC: 1 MG/DL (ref 0.5–0.9)
EKG ATRIAL RATE: 77 BPM
EKG Q-T INTERVAL: 358 MS
EKG QRS DURATION: 74 MS
EKG QTC CALCULATION (BAZETT): 405 MS
EKG R AXIS: 25 DEGREES
EKG T AXIS: -168 DEGREES
EKG VENTRICULAR RATE: 77 BPM
EOSINOPHIL # BLD: 0.59 K/UL (ref 0–0.44)
EOSINOPHILS RELATIVE PERCENT: 7 % (ref 1–4)
ERYTHROCYTE [DISTWIDTH] IN BLOOD BY AUTOMATED COUNT: 15.5 % (ref 11.8–14.4)
GFR, ESTIMATED: 50 ML/MIN/1.73M2
GLOBULIN SER CALC-MCNC: 2.2 G/DL
GLUCOSE SERPL-MCNC: 125 MG/DL (ref 74–99)
HCT VFR BLD AUTO: 26.4 % (ref 36.3–47.1)
HGB BLD-MCNC: 8.3 G/DL (ref 11.9–15.1)
IMM GRANULOCYTES # BLD AUTO: 0.16 K/UL (ref 0–0.3)
IMM GRANULOCYTES NFR BLD: 2 %
LYMPHOCYTES NFR BLD: 2.22 K/UL (ref 1.1–3.7)
LYMPHOCYTES RELATIVE PERCENT: 26 % (ref 24–43)
MCH RBC QN AUTO: 32.9 PG (ref 25.2–33.5)
MCHC RBC AUTO-ENTMCNC: 31.4 G/DL (ref 28.4–34.8)
MCV RBC AUTO: 104.8 FL (ref 82.6–102.9)
MONOCYTES NFR BLD: 1.14 K/UL (ref 0.1–1.2)
MONOCYTES NFR BLD: 13 % (ref 3–12)
MYOGLOBIN SERPL-MCNC: 1417 NG/ML (ref 25–58)
MYOGLOBIN SERPL-MCNC: 319 NG/ML (ref 25–58)
MYOGLOBIN SERPL-MCNC: 474 NG/ML (ref 25–58)
NEUTROPHILS NFR BLD: 52 % (ref 36–65)
NEUTS SEG NFR BLD: 4.48 K/UL (ref 1.5–8.1)
NRBC BLD-RTO: 0.2 PER 100 WBC
PLATELET # BLD AUTO: ABNORMAL K/UL (ref 138–453)
PLATELET, FLUORESCENCE: 146 K/UL (ref 138–453)
PLATELETS.RETICULATED NFR BLD AUTO: 8.1 % (ref 1.1–10.3)
POTASSIUM SERPL-SCNC: 4 MMOL/L (ref 3.7–5.3)
PROT SERPL-MCNC: 5.3 G/DL (ref 6.6–8.7)
RBC # BLD AUTO: 2.52 M/UL (ref 3.95–5.11)
RBC # BLD: ABNORMAL 10*6/UL
RBC # BLD: ABNORMAL 10*6/UL
SODIUM SERPL-SCNC: 137 MMOL/L (ref 136–145)
WBC OTHER # BLD: 8.6 K/UL (ref 3.5–11.3)

## 2024-05-13 PROCEDURE — 97530 THERAPEUTIC ACTIVITIES: CPT

## 2024-05-13 PROCEDURE — 36415 COLL VENOUS BLD VENIPUNCTURE: CPT

## 2024-05-13 PROCEDURE — 97535 SELF CARE MNGMENT TRAINING: CPT

## 2024-05-13 PROCEDURE — 80076 HEPATIC FUNCTION PANEL: CPT

## 2024-05-13 PROCEDURE — 6370000000 HC RX 637 (ALT 250 FOR IP)

## 2024-05-13 PROCEDURE — 2060000000 HC ICU INTERMEDIATE R&B

## 2024-05-13 PROCEDURE — 85025 COMPLETE CBC W/AUTO DIFF WBC: CPT

## 2024-05-13 PROCEDURE — 97116 GAIT TRAINING THERAPY: CPT

## 2024-05-13 PROCEDURE — 6360000002 HC RX W HCPCS: Performed by: NURSE PRACTITIONER

## 2024-05-13 PROCEDURE — 93010 ELECTROCARDIOGRAM REPORT: CPT | Performed by: INTERNAL MEDICINE

## 2024-05-13 PROCEDURE — 2580000003 HC RX 258: Performed by: STUDENT IN AN ORGANIZED HEALTH CARE EDUCATION/TRAINING PROGRAM

## 2024-05-13 PROCEDURE — 99222 1ST HOSP IP/OBS MODERATE 55: CPT | Performed by: INTERNAL MEDICINE

## 2024-05-13 PROCEDURE — 82550 ASSAY OF CK (CPK): CPT

## 2024-05-13 PROCEDURE — 99232 SBSQ HOSP IP/OBS MODERATE 35: CPT | Performed by: INTERNAL MEDICINE

## 2024-05-13 PROCEDURE — 99223 1ST HOSP IP/OBS HIGH 75: CPT | Performed by: STUDENT IN AN ORGANIZED HEALTH CARE EDUCATION/TRAINING PROGRAM

## 2024-05-13 PROCEDURE — 6360000002 HC RX W HCPCS: Performed by: STUDENT IN AN ORGANIZED HEALTH CARE EDUCATION/TRAINING PROGRAM

## 2024-05-13 PROCEDURE — 85055 RETICULATED PLATELET ASSAY: CPT

## 2024-05-13 PROCEDURE — 83874 ASSAY OF MYOGLOBIN: CPT

## 2024-05-13 PROCEDURE — 2580000003 HC RX 258

## 2024-05-13 PROCEDURE — 97110 THERAPEUTIC EXERCISES: CPT

## 2024-05-13 PROCEDURE — 80048 BASIC METABOLIC PNL TOTAL CA: CPT

## 2024-05-13 PROCEDURE — 72190 X-RAY EXAM OF PELVIS: CPT

## 2024-05-13 RX ORDER — ERGOCALCIFEROL 1.25 MG/1
50000 CAPSULE ORAL WEEKLY
Qty: 12 CAPSULE | Refills: 1 | Status: SHIPPED | OUTPATIENT
Start: 2024-05-13 | End: 2024-05-14

## 2024-05-13 RX ADMIN — LEVOTHYROXINE SODIUM 50 MCG: 50 TABLET ORAL at 09:32

## 2024-05-13 RX ADMIN — HEPARIN SODIUM 5000 UNITS: 5000 INJECTION INTRAVENOUS; SUBCUTANEOUS at 21:30

## 2024-05-13 RX ADMIN — HEPARIN SODIUM 5000 UNITS: 5000 INJECTION INTRAVENOUS; SUBCUTANEOUS at 13:25

## 2024-05-13 RX ADMIN — Medication 1000 MG: at 13:24

## 2024-05-13 RX ADMIN — SODIUM CHLORIDE, PRESERVATIVE FREE 10 ML: 5 INJECTION INTRAVENOUS at 21:30

## 2024-05-13 RX ADMIN — POLYETHYLENE GLYCOL 3350 17 G: 17 POWDER, FOR SOLUTION ORAL at 09:40

## 2024-05-13 RX ADMIN — SODIUM CHLORIDE, POTASSIUM CHLORIDE, SODIUM LACTATE AND CALCIUM CHLORIDE: 600; 310; 30; 20 INJECTION, SOLUTION INTRAVENOUS at 16:46

## 2024-05-13 RX ADMIN — SODIUM CHLORIDE, PRESERVATIVE FREE 10 ML: 5 INJECTION INTRAVENOUS at 09:41

## 2024-05-13 RX ADMIN — ACETAMINOPHEN 1000 MG: 500 TABLET ORAL at 09:32

## 2024-05-13 RX ADMIN — HEPARIN SODIUM 5000 UNITS: 5000 INJECTION INTRAVENOUS; SUBCUTANEOUS at 06:17

## 2024-05-13 RX ADMIN — ACETAMINOPHEN 1000 MG: 500 TABLET ORAL at 16:46

## 2024-05-13 ASSESSMENT — PAIN DESCRIPTION - LOCATION
LOCATION: ARM;SCROTUM
LOCATION: ARM;SHOULDER
LOCATION: ARM;SHOULDER

## 2024-05-13 ASSESSMENT — PAIN DESCRIPTION - PAIN TYPE: TYPE: ACUTE PAIN

## 2024-05-13 ASSESSMENT — PAIN DESCRIPTION - ORIENTATION
ORIENTATION: LEFT

## 2024-05-13 ASSESSMENT — PAIN SCALES - GENERAL
PAINLEVEL_OUTOF10: 0
PAINLEVEL_OUTOF10: 5
PAINLEVEL_OUTOF10: 6

## 2024-05-13 ASSESSMENT — PAIN DESCRIPTION - DESCRIPTORS
DESCRIPTORS: ACHING
DESCRIPTORS: ACHING
DESCRIPTORS: SORE;ACHING;DISCOMFORT

## 2024-05-13 ASSESSMENT — PAIN DESCRIPTION - FREQUENCY: FREQUENCY: CONTINUOUS

## 2024-05-13 ASSESSMENT — PAIN - FUNCTIONAL ASSESSMENT: PAIN_FUNCTIONAL_ASSESSMENT: ACTIVITIES ARE NOT PREVENTED

## 2024-05-13 ASSESSMENT — PAIN DESCRIPTION - ONSET: ONSET: ON-GOING
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
## 2024-05-13 NOTE — PROGRESS NOTES
[facility] Inpatient Progress Note  YULY MCCABE   5/13/2024    Genie Wisdom  8/27/1923  [id number]      Time spent with Patient: 0 minutes    This writer was unable to complete screen or therapy services with patient at bedside at this time due to the following:  Other service provider in room / procedure being performed: starting IV
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
## 2024-05-13 NOTE — PROGRESS NOTES
Physical Therapy  Facility/Department: [address] ONC/MED SURG  Physical Therapy Daily Treatment Note    Name: Genie Wisdom  : 1923  MRN: [id number]  Date of Service: 2024    Discharge Recommendations:  Patient would benefit from continued therapy after discharge   PT Equipment Recommendations  Equipment Needed: No      Patient Diagnosis(es): The primary encounter diagnosis was Fall, initial encounter. Diagnoses of Closed fracture of left inferior pubic ramus, initial encounter (HCC), Closed fracture of superior ramus of left pubis, initial encounter (HCC), and Elevated troponin were also pertinent to this visit.  Past Medical History:  has a past medical history of Dizziness, Dizzy spells, HTN (hypertension), Hyponatremia, and Hypothyroidism.  Past Surgical History:  has a past surgical history that includes Cataract removal (Bilateral, ); other surgical history (Left, 08/10/2015); other surgical history (Right, ); keratoplasty (Right, 10/2000); Appendectomy; Wrist ganglion excision; Pain management procedure (N/A, 2021); and Back Injection (Left, 2021).    Assessment   Body Structures, Functions, Activity Limitations Requiring Skilled Therapeutic Intervention: Decreased functional mobility ;Decreased ADL status;Decreased body mechanics;Decreased strength;Decreased safe awareness;Decreased high-level IADLs;Decreased balance;Decreased endurance;Decreased coordination;Decreased cognition;Decreased ROM;Decreased posture  Assessment: Pt required maxA x2 to perform bed mobility and was able to sit EOB with CGA/Anselmo. Pt stood with Anselmo x2/CGA and use of RW, and was able to ambulate ~2ft to chair with RW and Anselmo x2. Pt most limited by L shoulder pain, also decreased strength and balance. Recommending continued PT to address deficits and maxmimize safety and independence with mobility.  Therapy Prognosis: Good  Requires PT Follow-Up: Yes  Activity Tolerance  Activity Tolerance: Patient limited by  No  Stairs/Curb  Stairs?: No     Balance  Posture: Fair  Sitting - Static: Fair  Sitting - Dynamic: Fair;-  Standing - Static: Fair  Standing - Dynamic: Fair;-  Comments: Pt initially required maxA to maintain sitting balance, progressing to min/CGA once balance was established with RUE placed on bedrail for support. Pt with an occassional R lean while seated EOB, able to correct with cueing. Standing balance assessed with RW.    Exercise Treatment:  Seated LE exercise program: Long Arc Quads, hip abduction/adduction, heel/toe raises (x20 reps), and marches. Reps: x10 BLE  Comments: Pt performed while seated in chair.      AM-PAC - Mobility  AM-PAC Basic Mobility - Inpatient   How much help is needed turning from your back to your side while in a flat bed without using bedrails?: A Lot  How much help is needed moving from lying on your back to sitting on the side of a flat bed without using bedrails?: Total  How much help is needed moving to and from a bed to a chair?: A Lot  How much help is needed standing up from a chair using your arms?: A Lot  How much help is needed walking in hospital room?: Total  How much help is needed climbing 3-5 steps with a railing?: Total  AM-PAC Inpatient Mobility Raw Score : 9  AM-PAC Inpatient T-Scale Score : 30.55  Mobility Inpatient CMS 0-100% Score: 81.38  Mobility Inpatient CMS G-Code Modifier : CM      Goals  Short Term Goals  Time Frame for Short Term Goals: 14 visits  Short Term Goal 1: Complete bed mobility with min A  Short Term Goal 2: Complete transfers with mod A and RW, protected WBAT with walker  Short Term Goal 3: Ambulate 100 ft with mod A and RW, protected WBAT with walker  Short Term Goal 4: Participate in 30 minutes of therapy to promote endurance       Education  Patient Education  Education Given To: Patient  Education Provided: Role of Therapy;Plan of Care;Home Exercise Program;Transfer Training  Education Method: Verbal;Demonstration  Barriers to Learning:  Cognition  Education Outcome: Continued education needed;Verbalized understanding      Therapy Time   Individual Concurrent Group Co-treatment   Time In 0848         Time Out 0933         Minutes 45         Timed Code Treatment Minutes: 38 Minutes       Joy Mason PTA

## 2024-05-13 NOTE — DISCHARGE INSTR - COC
Continuity of Care Form    Patient Name: Genie Wisdom   :  1923  MRN:  [id number]    Admit date:  2024  Discharge date:  05/15/2024      Code Status Order: DNR-CCA   Advance Directives:     Admitting Physician:  Victor M Pascual MD  PCP: Kathya Grayson, APRN - CNP    Discharging Nurse: Joy Barkley     Discharging Hospital Unit/Room#: 0434/0434-01  Discharging Unit Phone Number: [phone]      Emergency Contact:   Extended Emergency Contact Information  Primary Emergency Contact: YAKOV MEEKS  Home Phone: [phone]  Work Phone: [phone]  Relation: Unknown  Secondary Emergency Contact: Maria Luz Fenton  Mobile Phone: [phone]  Relation: Child  Preferred language: English   needed? No    Past Surgical History:  Past Surgical History:   Procedure Laterality Date    APPENDECTOMY      BACK INJECTION Left 2021    Left Si joint inj with left piriformis muscle inj local only performed by Tyler Olsen MD at [facility]    CATARACT REMOVAL Bilateral 2014    Left- Right eye- about 10 years ago    KERATOPLASTY Right 10/2000    OTHER SURGICAL HISTORY Left 08/10/2015    Decompression of median nerve    OTHER SURGICAL HISTORY Right     Decompression of median nerve    PAIN MANAGEMENT PROCEDURE N/A 2021    Lumbar NYDIA L3-L4 left of midline Local performed by Tyler Olsen MD at [facility]    WRIST GANGLION EXCISION         Immunization History:   Immunization History   Administered Date(s) Administered    COVID-19, PFIZER PURPLE top, DILUTE for use, (age 12 y+), 30mcg/0.3mL 2021, 2021, 12/15/2021    Influenza A (Q8F5-45) Vaccine PF IM 01/15/2010    Influenza Virus Vaccine 2021    Influenza, FLUAD, (age 65 y+), Adjuvanted, 0.5mL 2020, 2021    Influenza, FLUARIX, FLULAVAL, FLUZONE (age 6 mo+) AND AFLURIA, (age 3 y+), PF, 0.5mL 2020    Influenza, FLUZONE (age 65 y+), High Dose, 0.7mL 10/05/2022, 2023    Influenza, High Dose (Fluzone 65 yrs
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
## 2024-05-13 NOTE — PROGRESS NOTES
Occupational Therapy  Facility/Department: [address] ONC/MED SURG  Occupational Therapy Daily Treatment Note    Name: Genie Wisdom  : 1923  MRN: [id number]  Date of Service: 2024    Discharge Recommendations:  Further therapy recommended at discharge.The patient should be able to tolerate at least 3 hours of therapy per day over 5 days or 15 hours over 7 days.   This patient may benefit from a Physical Medicine and Rehab consult. Discussed with OTR to update POC         Patient Diagnosis(es): The primary encounter diagnosis was Fall, initial encounter. Diagnoses of Closed fracture of left inferior pubic ramus, initial encounter (HCC), Closed fracture of superior ramus of left pubis, initial encounter (HCC), and Elevated troponin were also pertinent to this visit.  Past Medical History:  has a past medical history of Dizziness, Dizzy spells, HTN (hypertension), Hyponatremia, and Hypothyroidism.  Past Surgical History:  has a past surgical history that includes Cataract removal (Bilateral, ); other surgical history (Left, 08/10/2015); other surgical history (Right, ); keratoplasty (Right, 10/2000); Appendectomy; Wrist ganglion excision; Pain management procedure (N/A, 2021); and Back Injection (Left, 2021).           Assessment   Performance deficits / Impairments: Decreased functional mobility ;Decreased ADL status;Decreased ROM;Decreased strength;Decreased cognition;Decreased safe awareness;Decreased high-level IADLs;Decreased endurance;Decreased posture;Decreased coordination;Decreased balance  Prognosis: Good  REQUIRES OT FOLLOW-UP: Yes  Activity Tolerance  Activity Tolerance: Patient Tolerated treatment well        Plan   Occupational Therapy Plan  Times Per Week: 3-5 x/week     Restrictions  Restrictions/Precautions  Restrictions/Precautions: General Precautions, Fall Risk, Weight Bearing  Required Braces or Orthoses?: No  Lower Extremity Weight Bearing Restrictions  Right Lower Extremity
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
## 2024-05-13 NOTE — CARE COORDINATION
Left Vm for Genie at [facility] to follow up on referral, I requested a return call    Spoke with Yamilka at [facility], referral faxed to [phone] call back from Genie at [facility], they can accept and will start precert    [facility] has precert good until 5/15/24

## 2024-05-14 ENCOUNTER — CASE MANAGEMENT (OUTPATIENT)
Dept: PSYCHIATRY | Age: 89
End: 2024-05-14

## 2024-05-14 LAB
ANION GAP SERPL CALCULATED.3IONS-SCNC: 6 MMOL/L (ref 9–16)
BASOPHILS # BLD: 0.05 K/UL (ref 0–0.2)
BASOPHILS NFR BLD: 1 % (ref 0–2)
BUN SERPL-MCNC: 27 MG/DL (ref 8–23)
CALCIUM SERPL-MCNC: 8.2 MG/DL (ref 8.6–10.4)
CHLORIDE SERPL-SCNC: 109 MMOL/L (ref 98–107)
CO2 SERPL-SCNC: 23 MMOL/L (ref 20–31)
CREAT SERPL-MCNC: 0.9 MG/DL (ref 0.5–0.9)
EKG ATRIAL RATE: 105 BPM
EKG P AXIS: 86 DEGREES
EKG P-R INTERVAL: 264 MS
EKG Q-T INTERVAL: 320 MS
EKG QRS DURATION: 72 MS
EKG QTC CALCULATION (BAZETT): 422 MS
EKG R AXIS: 27 DEGREES
EKG T AXIS: -166 DEGREES
EKG VENTRICULAR RATE: 105 BPM
EOSINOPHIL # BLD: 0.65 K/UL (ref 0–0.44)
EOSINOPHILS RELATIVE PERCENT: 8 % (ref 1–4)
ERYTHROCYTE [DISTWIDTH] IN BLOOD BY AUTOMATED COUNT: 15.9 % (ref 11.8–14.4)
GFR, ESTIMATED: 57 ML/MIN/1.73M2
GLUCOSE SERPL-MCNC: 107 MG/DL (ref 74–99)
HCT VFR BLD AUTO: 24.9 % (ref 36.3–47.1)
HGB BLD-MCNC: 7.9 G/DL (ref 11.9–15.1)
IMM GRANULOCYTES # BLD AUTO: 0.31 K/UL (ref 0–0.3)
IMM GRANULOCYTES NFR BLD: 4 %
LYMPHOCYTES NFR BLD: 2.15 K/UL (ref 1.1–3.7)
LYMPHOCYTES RELATIVE PERCENT: 25 % (ref 24–43)
MCH RBC QN AUTO: 32.4 PG (ref 25.2–33.5)
MCHC RBC AUTO-ENTMCNC: 31.7 G/DL (ref 28.4–34.8)
MCV RBC AUTO: 102 FL (ref 82.6–102.9)
MONOCYTES NFR BLD: 1.22 K/UL (ref 0.1–1.2)
MONOCYTES NFR BLD: 14 % (ref 3–12)
NEUTROPHILS NFR BLD: 49 % (ref 36–65)
NEUTS SEG NFR BLD: 4.22 K/UL (ref 1.5–8.1)
NRBC BLD-RTO: 0 PER 100 WBC
PLATELET # BLD AUTO: ABNORMAL K/UL (ref 138–453)
PLATELET, FLUORESCENCE: 140 K/UL (ref 138–453)
PLATELETS.RETICULATED NFR BLD AUTO: 6 % (ref 1.1–10.3)
POTASSIUM SERPL-SCNC: 4.1 MMOL/L (ref 3.7–5.3)
RBC # BLD AUTO: 2.44 M/UL (ref 3.95–5.11)
RBC # BLD: ABNORMAL 10*6/UL
SODIUM SERPL-SCNC: 138 MMOL/L (ref 136–145)
WBC OTHER # BLD: 8.6 K/UL (ref 3.5–11.3)

## 2024-05-14 PROCEDURE — 85025 COMPLETE CBC W/AUTO DIFF WBC: CPT

## 2024-05-14 PROCEDURE — 2580000003 HC RX 258: Performed by: STUDENT IN AN ORGANIZED HEALTH CARE EDUCATION/TRAINING PROGRAM

## 2024-05-14 PROCEDURE — 6360000002 HC RX W HCPCS: Performed by: STUDENT IN AN ORGANIZED HEALTH CARE EDUCATION/TRAINING PROGRAM

## 2024-05-14 PROCEDURE — 99232 SBSQ HOSP IP/OBS MODERATE 35: CPT | Performed by: INTERNAL MEDICINE

## 2024-05-14 PROCEDURE — 99231 SBSQ HOSP IP/OBS SF/LOW 25: CPT | Performed by: NURSE PRACTITIONER

## 2024-05-14 PROCEDURE — 36415 COLL VENOUS BLD VENIPUNCTURE: CPT

## 2024-05-14 PROCEDURE — 80048 BASIC METABOLIC PNL TOTAL CA: CPT

## 2024-05-14 PROCEDURE — 96127 BRIEF EMOTIONAL/BEHAV ASSMT: CPT | Performed by: SOCIAL WORKER

## 2024-05-14 PROCEDURE — 6370000000 HC RX 637 (ALT 250 FOR IP): Performed by: STUDENT IN AN ORGANIZED HEALTH CARE EDUCATION/TRAINING PROGRAM

## 2024-05-14 PROCEDURE — 85055 RETICULATED PLATELET ASSAY: CPT

## 2024-05-14 PROCEDURE — 6360000002 HC RX W HCPCS: Performed by: NURSE PRACTITIONER

## 2024-05-14 PROCEDURE — 2060000000 HC ICU INTERMEDIATE R&B

## 2024-05-14 PROCEDURE — 93010 ELECTROCARDIOGRAM REPORT: CPT | Performed by: INTERNAL MEDICINE

## 2024-05-14 PROCEDURE — 51798 US URINE CAPACITY MEASURE: CPT

## 2024-05-14 PROCEDURE — 2580000003 HC RX 258

## 2024-05-14 PROCEDURE — 6370000000 HC RX 637 (ALT 250 FOR IP)

## 2024-05-14 RX ORDER — ERGOCALCIFEROL 1.25 MG/1
50000 CAPSULE ORAL WEEKLY
Qty: 12 CAPSULE | Refills: 1 | DISCHARGE
Start: 2024-05-14

## 2024-05-14 RX ORDER — CEPHALEXIN 250 MG/1
250 CAPSULE ORAL 4 TIMES DAILY
Qty: 20 CAPSULE | Refills: 0 | DISCHARGE
Start: 2024-05-14 | End: 2024-05-19

## 2024-05-14 RX ADMIN — SODIUM CHLORIDE, PRESERVATIVE FREE 10 ML: 5 INJECTION INTRAVENOUS at 09:40

## 2024-05-14 RX ADMIN — HEPARIN SODIUM 5000 UNITS: 5000 INJECTION INTRAVENOUS; SUBCUTANEOUS at 14:19

## 2024-05-14 RX ADMIN — Medication 1000 MG: at 12:37

## 2024-05-14 RX ADMIN — LEVOTHYROXINE SODIUM 50 MCG: 50 TABLET ORAL at 09:39

## 2024-05-14 RX ADMIN — SODIUM CHLORIDE, PRESERVATIVE FREE 10 ML: 5 INJECTION INTRAVENOUS at 22:18

## 2024-05-14 RX ADMIN — OXYCODONE 2.5 MG: 5 TABLET ORAL at 15:59

## 2024-05-14 RX ADMIN — HEPARIN SODIUM 5000 UNITS: 5000 INJECTION INTRAVENOUS; SUBCUTANEOUS at 06:11

## 2024-05-14 RX ADMIN — ACETAMINOPHEN 1000 MG: 500 TABLET ORAL at 17:42

## 2024-05-14 RX ADMIN — POLYETHYLENE GLYCOL 3350 17 G: 17 POWDER, FOR SOLUTION ORAL at 09:40

## 2024-05-14 RX ADMIN — HEPARIN SODIUM 5000 UNITS: 5000 INJECTION INTRAVENOUS; SUBCUTANEOUS at 22:18

## 2024-05-14 RX ADMIN — ACETAMINOPHEN 1000 MG: 500 TABLET ORAL at 09:40

## 2024-05-14 RX ADMIN — SODIUM CHLORIDE, POTASSIUM CHLORIDE, SODIUM LACTATE AND CALCIUM CHLORIDE: 600; 310; 30; 20 INJECTION, SOLUTION INTRAVENOUS at 22:34

## 2024-05-14 RX ADMIN — SODIUM CHLORIDE, POTASSIUM CHLORIDE, SODIUM LACTATE AND CALCIUM CHLORIDE: 600; 310; 30; 20 INJECTION, SOLUTION INTRAVENOUS at 06:10

## 2024-05-14 ASSESSMENT — PAIN DESCRIPTION - DESCRIPTORS
DESCRIPTORS: NUMBNESS;SORE
DESCRIPTORS: NUMBNESS;ACHING

## 2024-05-14 ASSESSMENT — PAIN DESCRIPTION - ORIENTATION
ORIENTATION: LEFT
ORIENTATION: LEFT

## 2024-05-14 ASSESSMENT — PAIN - FUNCTIONAL ASSESSMENT
PAIN_FUNCTIONAL_ASSESSMENT: ACTIVITIES ARE NOT PREVENTED
PAIN_FUNCTIONAL_ASSESSMENT: ACTIVITIES ARE NOT PREVENTED

## 2024-05-14 ASSESSMENT — PAIN DESCRIPTION - LOCATION
LOCATION: SHOULDER
LOCATION: SHOULDER

## 2024-05-14 ASSESSMENT — PAIN SCALES - GENERAL
PAINLEVEL_OUTOF10: 5
PAINLEVEL_OUTOF10: 4
PAINLEVEL_OUTOF10: 0
PAINLEVEL_OUTOF10: 1
PAINLEVEL_OUTOF10: 0

## 2024-05-14 NOTE — PLAN OF CARE
Problem: Safety - Adult  Goal: Free from fall injury  5/14/2024 0134 by Amy Cisse RN  Outcome: Progressing  5/13/2024 1626 by Sugar Eason RN  Outcome: Progressing     Problem: Pain  Goal: Verbalizes/displays adequate comfort level or baseline comfort level  5/14/2024 0134 by Amy Cisse RN  Outcome: Progressing  Flowsheets (Taken 5/13/2024 1952)  Verbalizes/displays adequate comfort level or baseline comfort level:   Encourage patient to monitor pain and request assistance   Assess pain using appropriate pain scale   Administer analgesics based on type and severity of pain and evaluate response   Implement non-pharmacological measures as appropriate and evaluate response   Consider cultural and social influences on pain and pain management   Notify Licensed Independent Practitioner if interventions unsuccessful or patient reports new pain  5/13/2024 1626 by Sugar Eason RN  Outcome: Progressing     Problem: Skin/Tissue Integrity  Goal: Absence of new skin breakdown  Description: 1.  Monitor for areas of redness and/or skin breakdown  2.  Assess vascular access sites hourly  3.  Every 4-6 hours minimum:  Change oxygen saturation probe site  4.  Every 4-6 hours:  If on nasal continuous positive airway pressure, respiratory therapy assess nares and determine need for appliance change or resting period.  5/14/2024 0134 by Amy Cisse RN  Outcome: Progressing  5/13/2024 1626 by Sugar Eason RN  Outcome: Progressing     Problem: ABCDS Injury Assessment  Goal: Absence of physical injury  5/14/2024 0134 by Amy Cisse RN  Outcome: Progressing  5/13/2024 1626 by Sugar Eason RN  Outcome: Progressing
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
## 2024-05-14 NOTE — CONSULTS
[facility] Inpatient Brief Diagnostic Assessment Note  YULY MCCABE   5/14/2024    Genie Wisdom  8/27/1923  [id number]      Time spent with Patient: 10 minutes     Pt was provided informed consent for the [facility]. Discussed with patient model of service to include the limits of confidentiality (i.e. abuse reporting, suicide intervention, etc.) and short-term intervention focused approach.  Pt indicated understanding.    [facility] Inpatient or Virtual Question: This was not a virtual session      Presenting Patient Report:  Patient was screened at the request of the Trauma Team.   Patient presents as a 89 y.o. female subsequent to hospital admission following Fall resulting in injury.     Patient was able to complete the following screens: ITSS          MSE:     Appearance:   Hospital Gown, Disheveled, Good Hygiene, and Good Eye Contact  Speech    spontaneous, normal rate, normal volume, and well articulated  Affect Observed   Appropriate to Context  Thought Content    intact  Thought Process    linear, goal directed, and coherent  Associations    logical connections  Insight    Fair  Judgment    Intact  Orientation    oriented to person, place, time, and general circumstances      Patient reports and/or exhibits the following symptoms:    Mood: Appropriate to Context    Cognitive symptoms: Appropriate to Context    Behaviors: Appropriate to Context    Somatic: None Reported        Assessment:  Patient denies any previous or current symptoms for depression or anxiety.  Patient scored low on Injured Trauma Survivor Screen (ITSS).  Patient does not meet criteria for depression or anxiety diagnosis at this time.       Screening Scale Results (If Any):    ITSS:  Date Screen Completed: 05/14/2024  Patient's score= 0 for PTSD risk. ( ? 2 is positive for PTSD risk. )  Patient's score= 0 for depression risk.  ( ? 2 is positive for Depression risk )            Diagnoses: The following Diagnoses are

## 2024-05-14 NOTE — PROGRESS NOTES
PROGRESS NOTE    PATIENT NAME: Genie Wisdom  MEDICAL RECORD NO. [id number]  DATE: 5/14/2024    HD: # 3      DIAGNOSIS AND PLAN    Unwitnessed fall, L superior and inferior pubic rami fx with associated 8.7 x 4.1 cm hematoma along the left acetabulum   -ortho consult, non-op management    -Protected weightbearing to the L leg with rolling walker at all times   -f/u with ortho in clinic 6/10   -post mobilization films 5/13 stable    -tolerating diet, pain controlled   -PT/OT    UTI   -Rocephin x5 days, stop date 5/16    DVT ppx: Heparin     Dispo: plan for SNF, medically stable for DC    SUBJECTIVE  Patient seen and examined at bedside.  No acute events overnight.  VSS, afebrile. Reports some left pelvic pain today. Able to get to side of bed with PT yesterday. Encouraged continued participation today.     OBJECTIVE  VITALS:   Vitals:    05/14/24 1140   BP: (!) 136/56   Pulse: 79   Resp: 19   Temp: 97.7 °F (36.5 °C)   SpO2: 100%     Physical Exam  Constitutional:       General: She is not in acute distress.  HENT:      Head: Normocephalic and atraumatic.      Right Ear: External ear normal.      Mouth/Throat:      Mouth: Mucous membranes are moist.   Eyes:      Extraocular Movements: Extraocular movements intact.   Cardiovascular:      Rate and Rhythm: Normal rate.   Pulmonary:      Effort: Pulmonary effort is normal. No respiratory distress.   Abdominal:      General: There is no distension.      Palpations: Abdomen is soft.      Tenderness: There is no abdominal tenderness. There is no guarding or rebound.   Musculoskeletal:      Cervical back: Neck supple.   Skin:     General: Skin is warm and dry.      Comments: Ecchymosis in the BLE   Neurological:      Mental Status: She is alert. Mental status is at baseline.   Psychiatric:         Mood and Affect: Mood normal.         Behavior: Behavior normal.       LAB:  CBC:   Recent Labs     05/12/24  0417 05/13/24  0948 05/14/24  0722   WBC 8.8 8.6 8.6   HGB 7.9* 8.3*  degenerative changes to the glenohumeral joint.     XR SHOULDER LEFT (MIN 2 VIEWS)    Result Date: 5/11/2024  EXAMINATION: 3 XRAY VIEWS OF THE LEFT SHOULDER; TWO XRAY VIEWS OF THE LEFT HUMERUS 5/11/2024 6:52 pm COMPARISON: None. HISTORY: ORDERING SYSTEM PROVIDED HISTORY: trauma TECHNOLOGIST PROVIDED HISTORY: trauma Reason for Exam: fall FINDINGS: Three views of the left shoulder and two views of the left humerus are provided.  Images are interpreted in conjunction with one another. Osteopenia. No fractures or dislocations.  No suspicious focal bony lesions.  No bony erosions or bony destructive changes. Moderate degenerative changes to the AC joint.  No findings for AC joint separation.  Mild degenerative changes to the glenohumeral joint. No acute soft tissue abnormalities.     1. No acute osseous abnormalities. 2. Moderate degenerative changes to the AC joint. Mild degenerative changes to the glenohumeral joint.     XR PELVIS (MIN 3 VIEWS)    Result Date: 5/11/2024  EXAMINATION: ONE XRAY VIEW OF THE PELVIS 5/11/2024 6:52 pm COMPARISON: None. HISTORY: ORDERING SYSTEM PROVIDED HISTORY: Trauma/Fracture TECHNOLOGIST PROVIDED HISTORY: AP, Inlet and Outlet views please, thank you. Trauma/Fracture Reason for Exam: fall FINDINGS: Gas-filled loops of small bowel.  Bethea catheter in the pelvis.  Left superior and inferior pubic rami fractures.     Fracture superior and inferior pubic ramus on the left.  Ileus.          Dot Norton, WILLIAM - CNP  5/14/2024, 2:39 PM

## 2024-05-14 NOTE — CARE COORDINATION
Transitional planning: Phone call from Josiane Wesley with [facility] requesting an update on discharge. Questions answered. Auth good through 5/15. She will check if it is time sensitive on the 15th.    1553 Left HIPAA compliant message for Josiane Wesley with [facility] requesting a return call regarding IV Rocephin dose via PIV to be given on 5/16 if patient sent there tomorrow. Call back number provided.    1616 Phone call from Josiane Wesley with [facility]. They can give the Rocephin via PIV and can take the patient tomorrow.    1639 Transport request faxed to [facility] with 3pm tomorrow requested transport time (after 1pm Rocephin dose).    1654 Left HIPAA compliant message for daughter Maria Luz to notify of the above. Call back number provided.   None
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
## 2024-05-14 NOTE — CARE COORDINATION
Received message from Bryanna Mcdaniels, to leave ref info on her confidential VM.   Called Enedelia back and ref made via message on her VM.

## 2024-05-15 VITALS
RESPIRATION RATE: 21 BRPM | TEMPERATURE: 98.1 F | DIASTOLIC BLOOD PRESSURE: 62 MMHG | BODY MASS INDEX: 29.77 KG/M2 | WEIGHT: 147.49 LBS | SYSTOLIC BLOOD PRESSURE: 152 MMHG | HEART RATE: 80 BPM | OXYGEN SATURATION: 100 %

## 2024-05-15 PROBLEM — N30.00 ACUTE CYSTITIS WITHOUT HEMATURIA: Status: ACTIVE | Noted: 2024-05-15

## 2024-05-15 PROCEDURE — 99239 HOSP IP/OBS DSCHRG MGMT >30: CPT | Performed by: NURSE PRACTITIONER

## 2024-05-15 PROCEDURE — 99231 SBSQ HOSP IP/OBS SF/LOW 25: CPT | Performed by: INTERNAL MEDICINE

## 2024-05-15 PROCEDURE — 6360000002 HC RX W HCPCS: Performed by: STUDENT IN AN ORGANIZED HEALTH CARE EDUCATION/TRAINING PROGRAM

## 2024-05-15 PROCEDURE — 6370000000 HC RX 637 (ALT 250 FOR IP)

## 2024-05-15 PROCEDURE — 2580000003 HC RX 258

## 2024-05-15 PROCEDURE — 6360000002 HC RX W HCPCS: Performed by: NURSE PRACTITIONER

## 2024-05-15 RX ADMIN — LEVOTHYROXINE SODIUM 50 MCG: 50 TABLET ORAL at 09:55

## 2024-05-15 RX ADMIN — ACETAMINOPHEN 1000 MG: 500 TABLET ORAL at 09:55

## 2024-05-15 RX ADMIN — POLYETHYLENE GLYCOL 3350 17 G: 17 POWDER, FOR SOLUTION ORAL at 09:54

## 2024-05-15 RX ADMIN — ACETAMINOPHEN 1000 MG: 500 TABLET ORAL at 01:05

## 2024-05-15 RX ADMIN — Medication 1000 MG: at 13:09

## 2024-05-15 RX ADMIN — SODIUM CHLORIDE, PRESERVATIVE FREE 10 ML: 5 INJECTION INTRAVENOUS at 09:56

## 2024-05-15 RX ADMIN — HEPARIN SODIUM 5000 UNITS: 5000 INJECTION INTRAVENOUS; SUBCUTANEOUS at 06:14

## 2024-05-15 RX ADMIN — SODIUM CHLORIDE, PRESERVATIVE FREE 10 ML: 5 INJECTION INTRAVENOUS at 13:09

## 2024-05-15 ASSESSMENT — PAIN SCALES - GENERAL
PAINLEVEL_OUTOF10: 0
PAINLEVEL_OUTOF10: 0
PAINLEVEL_OUTOF10: 3
PAINLEVEL_OUTOF10: 0

## 2024-05-15 ASSESSMENT — PAIN - FUNCTIONAL ASSESSMENT: PAIN_FUNCTIONAL_ASSESSMENT: ACTIVITIES ARE NOT PREVENTED

## 2024-05-15 ASSESSMENT — PAIN DESCRIPTION - LOCATION: LOCATION: SHOULDER

## 2024-05-15 ASSESSMENT — PAIN DESCRIPTION - DESCRIPTORS: DESCRIPTORS: DISCOMFORT;ACHING

## 2024-05-15 ASSESSMENT — PAIN DESCRIPTION - ORIENTATION: ORIENTATION: LEFT

## 2024-05-15 NOTE — PROGRESS NOTES
VANESSA completed. Report given to transport. Called and gave report to nurse Lugo at [facility]. R. PIV intact and left in place for last dose of IV Rocephin due tomorrow 5/16/2024. Facility already aware, also reiterated in report to nurse Lugo at [facility]. Patiient left via transport. Family aware and at bedside.

## 2024-05-15 NOTE — CARE COORDINATION
Noted plans for discharge today to [facility].  Called Enedelia at [facility] and updated. She would like to be notified if pt returns to the community. Added to VANESSA ref info and to contact [facility] if pt discharges back home.

## 2024-05-15 NOTE — PLAN OF CARE
Problem: Safety - Adult  Goal: Free from fall injury  5/15/2024 1559 by Joy Barkley RN  Outcome: Adequate for Discharge  Flowsheets (Taken 5/15/2024 0800)  Free From Fall Injury: Instruct family/caregiver on patient safety  5/15/2024 0333 by Amy Cisse RN  Outcome: Progressing     Problem: Pain  Goal: Verbalizes/displays adequate comfort level or baseline comfort level  5/15/2024 1559 by Joy Barkley RN  Outcome: Adequate for Discharge  5/15/2024 0333 by Amy Cisse RN  Outcome: Progressing  Flowsheets (Taken 5/14/2024 1922)  Verbalizes/displays adequate comfort level or baseline comfort level:   Encourage patient to monitor pain and request assistance   Assess pain using appropriate pain scale   Administer analgesics based on type and severity of pain and evaluate response   Implement non-pharmacological measures as appropriate and evaluate response   Consider cultural and social influences on pain and pain management   Notify Licensed Independent Practitioner if interventions unsuccessful or patient reports new pain     Problem: Skin/Tissue Integrity  Goal: Absence of new skin breakdown  Description: 1.  Monitor for areas of redness and/or skin breakdown  2.  Assess vascular access sites hourly  3.  Every 4-6 hours minimum:  Change oxygen saturation probe site  4.  Every 4-6 hours:  If on nasal continuous positive airway pressure, respiratory therapy assess nares and determine need for appliance change or resting period.  5/15/2024 1559 by Joy Barkley RN  Outcome: Adequate for Discharge  5/15/2024 0333 by Amy Cisse RN  Outcome: Progressing     Problem: ABCDS Injury Assessment  Goal: Absence of physical injury  5/15/2024 1559 by Joy Barkley RN  Outcome: Adequate for Discharge  5/15/2024 0333 by Amy Cisse RN  Outcome: Progressing  Flowsheets (Taken 5/15/2024 0300)  Absence of Physical Injury: Implement safety measures based on patient assessment

## 2024-05-15 NOTE — PROGRESS NOTES
[facility]  Office: [phone]  Haider Dougherty DO, Scot Ponce, DO, Henrik Heath DO, Syed Richmond, DO, Ronna Hudson MD, Yamileth Ponce MD, Rick Rizo MD, Wendy Rossi MD,  Álvaro Kingston MD, Johnnie Pandya MD, Jagruti Fagan MD,  Ninfa Rodriguez DO, Miriam Rebollar MD, Maged Durán MD, Zian Dougherty DO, Shirlene Calvert MD,  Tyler Lim DO, Hien Pereira MD, Maryan Greene MD, Hiral Cruz MD, Anuj Khan MD,  Mir Mondragon MD, Gema Carrillo MD, Suhail Lee MD, Nithin Castelan MD, Tate Sutton MD, Juanis Petersen MD, Jose Yuan DO, Prateek Lou DO, Bernadette Delgado MD,  Sebas Oliva MD, Shirley Waterhouse, CNP,  Rhoda Saleh CNP, Alfredo Ornelas, CNP,  Yamilka Blanco, DNP, Paige Rivera, CNP, Linda Drew, CNP, Amber Williamson CNP, Michell Tavarez, CNP, Jaki Lua, PA-C, Renee Adams PA-C, Meghan Ray, CNP, Ary Samaniego, CNP, Domingo Randall, CNP, Catina Pelletier, CNP, Wilma Mason CNP, Vonnie Cameron, CNS, Rocio Mehta, CNP, Shara Shen CNP, Tracy Schwab, CNP         [facility]   IN-PATIENT SERVICE   [facility]    Progress Note    5/15/2024    11:09 AM    Name:   Genie Wisdom  MRN:     [id number]     Acct:      [id number]   Room:   0434/0434-01   Day:  4  Admit Date:  5/11/2024 12:37 PM    PCP:   Kathya Grayson APRN - CNP  Code Status:  DNR-CCA    Subjective:     C/C:   Chief Complaint   Patient presents with    Fall     Interval History Status: not changed.     No issues overnight  No current complaints  No further workup per cardiology given advanced age  Discussed with family at bedside     Review of Systems:     Constitutional:  negative for chills, fevers, sweats  Respiratory:  negative for cough, dyspnea on exertion, shortness of breath, wheezing  Cardiovascular:  negative for chest pain, chest pressure/discomfort, lower extremity edema, palpitations  Gastrointestinal:  negative for abdominal pain,  left wrist, both knees and both ankles demonstrate no acute abnormality.     XR KNEE LEFT (3 VIEWS)    Result Date: 5/11/2024  Studies of the left elbow, left wrist, both knees and both ankles demonstrate no acute abnormality.     XR ANKLE LEFT (MIN 3 VIEWS)    Result Date: 5/11/2024  Studies of the left elbow, left wrist, both knees and both ankles demonstrate no acute abnormality.     XR KNEE RIGHT (3 VIEWS)    Result Date: 5/11/2024  Studies of the left elbow, left wrist, both knees and both ankles demonstrate no acute abnormality.     XR ANKLE RIGHT (MIN 3 VIEWS)    Result Date: 5/11/2024  Studies of the left elbow, left wrist, both knees and both ankles demonstrate no acute abnormality.     CT HEAD WO CONTRAST    Result Date: 5/11/2024  CT HEAD: No evidence for acute intracranial pathology. Findings likely reflecting chronic small vessel ischemic change. 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 CT CERVICAL SPINE: No acute abnormality of the cervical spine. Multilevel degenerative changes.     CT CERVICAL SPINE WO CONTRAST    Result Date: 5/11/2024  CT HEAD: No evidence for acute intracranial pathology. Findings likely reflecting chronic small vessel ischemic change. 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 CT CERVICAL SPINE: No acute abnormality of the cervical spine. Multilevel degenerative changes.     CT THORACIC SPINE BONY RECONSTRUCTION    Result Date: 5/11/2024  No evidence for acute fracture to the thoracic spine.  If there is persistent clinical concern or focal neurologic deficit MRI can be obtained. Mild multilevel degenerative changes.     CT LUMBAR SPINE BONY RECONSTRUCTION    Result Date: 5/11/2024  No acute osseous abnormality.     CT CHEST ABDOMEN PELVIS W CONTRAST Additional Contrast? None    Result Date: 5/11/2024  1. Acute fractures of the left superior and inferior pubic rami with an associated 8.7 x 4.1 cm hematoma along the

## 2024-05-15 NOTE — CARE COORDINATION
Transitional planning    Writer received call from Christelle with [facility], transport time 2:15 confirmed with [facility].          1030 patient and family updated and agreeable to plan.

## 2024-05-15 NOTE — PLAN OF CARE
Problem: Safety - Adult  Goal: Free from fall injury  Outcome: Progressing     Problem: Pain  Goal: Verbalizes/displays adequate comfort level or baseline comfort level  Outcome: Progressing  Flowsheets (Taken 5/14/2024 1922)  Verbalizes/displays adequate comfort level or baseline comfort level:   Encourage patient to monitor pain and request assistance   Assess pain using appropriate pain scale   Administer analgesics based on type and severity of pain and evaluate response   Implement non-pharmacological measures as appropriate and evaluate response   Consider cultural and social influences on pain and pain management   Notify Licensed Independent Practitioner if interventions unsuccessful or patient reports new pain     Problem: Skin/Tissue Integrity  Goal: Absence of new skin breakdown  Description: 1.  Monitor for areas of redness and/or skin breakdown  2.  Assess vascular access sites hourly  3.  Every 4-6 hours minimum:  Change oxygen saturation probe site  4.  Every 4-6 hours:  If on nasal continuous positive airway pressure, respiratory therapy assess nares and determine need for appliance change or resting period.  Outcome: Progressing     Problem: ABCDS Injury Assessment  Goal: Absence of physical injury  Outcome: Progressing
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
## 2024-05-15 NOTE — PROGRESS NOTES
PROGRESS NOTE    PATIENT NAME: Genie Wisdom  MEDICAL RECORD NO. [id number]  DATE: 5/15/2024    HD: # 4      DIAGNOSIS AND PLAN    Unwitnessed fall, L superior and inferior pubic rami fx with associated 8.7 x 4.1 cm hematoma along the left acetabulum   -ortho consult, non-op management    -Protected weightbearing to the L leg with rolling walker at all times   -f/u with ortho in clinic 6/10   -post mobilization films 5/13 stable    -tolerating diet, pain controlled   -PT/OT    UTI   -Rocephin x5 days, stop date 5/16, transition to Keflex po at DC    DVT ppx: Heparin     Dispo: medically stable to DC, transport set for SNF at 2:15 PM    CC: \"hello\"    SUBJECTIVE  Patient seen and examined at the bedside. She is eating lunch with family present. She denies pain, chest pain, shortness of breath. She is tolerating a diet.     OBJECTIVE  VITALS:   Vitals:    05/15/24 1112   BP: (!) 152/62   Pulse: 80   Resp: 21   Temp: 98.1 °F (36.7 °C)   SpO2: 100%     Physical Exam  Constitutional:       General: She is not in acute distress.  HENT:      Head: Normocephalic and atraumatic.      Right Ear: External ear normal.      Mouth/Throat:      Mouth: Mucous membranes are moist.   Eyes:      Extraocular Movements: Extraocular movements intact.   Cardiovascular:      Rate and Rhythm: Normal rate.   Pulmonary:      Effort: Pulmonary effort is normal. No respiratory distress.   Abdominal:      General: There is no distension.      Palpations: Abdomen is soft.      Tenderness: There is no abdominal tenderness. There is no guarding or rebound.   Musculoskeletal:      Cervical back: Neck supple.   Skin:     General: Skin is warm and dry.      Comments: Ecchymosis in the BLE   Neurological:      Mental Status: She is alert. Mental status is at baseline.   Psychiatric:         Mood and Affect: Mood normal.         Behavior: Behavior normal.       LAB:  CBC:   Recent Labs     05/13/24  0948 05/14/24  0722   WBC 8.6 8.6   HGB 8.3* 7.9*   HCT  26.4* 24.9*   .8* 102.0   PLT See Reflexed IPF Result See Reflexed IPF Result       BMP:   Recent Labs     05/13/24  0948 05/14/24  0722    138   K 4.0 4.1    109*   CO2 21 23   BUN 37* 27*   CREATININE 1.0* 0.9   GLUCOSE 125* 107*         RADIOLOGY:  XR PELVIS (MIN 3 VIEWS)    Result Date: 5/13/2024  EXAMINATION: ONE XRAY VIEW OF THE PELVIS 5/13/2024 11:16 am COMPARISON: Pelvis radiographs performed 05/11/2024. HISTORY: ORDERING SYSTEM PROVIDED HISTORY: Post mobilization after pelvic fractures TECHNOLOGIST PROVIDED HISTORY: Post mobilization AP, Inlet and Outlet views please, thank you. Post mobilization after pelvic fractures FINDINGS: There is redemonstration of left superior and inferior pubic rami fractures. The remainder of the bony pelvis is intact.  There is degenerative change of the SI joints.  There is degenerative change of hip joints.  The surrounding soft tissues are unremarkable.     Fracture of the superior and inferior left pubic rami. Degenerative change of the SI joints and hip joints.     XR HUMERUS LEFT (MIN 2 VIEWS)    Result Date: 5/11/2024  EXAMINATION: 3 XRAY VIEWS OF THE LEFT SHOULDER; TWO XRAY VIEWS OF THE LEFT HUMERUS 5/11/2024 6:52 pm COMPARISON: None. HISTORY: ORDERING SYSTEM PROVIDED HISTORY: trauma TECHNOLOGIST PROVIDED HISTORY: trauma Reason for Exam: fall FINDINGS: Three views of the left shoulder and two views of the left humerus are provided.  Images are interpreted in conjunction with one another. Osteopenia. No fractures or dislocations.  No suspicious focal bony lesions.  No bony erosions or bony destructive changes. Moderate degenerative changes to the AC joint.  No findings for AC joint separation.  Mild degenerative changes to the glenohumeral joint. No acute soft tissue abnormalities.     1. No acute osseous abnormalities. 2. Moderate degenerative changes to the AC joint. Mild degenerative changes to the glenohumeral joint.     XR SHOULDER LEFT (MIN 2

## 2024-05-16 LAB
MICROORGANISM SPEC CULT: NORMAL
MICROORGANISM SPEC CULT: NORMAL
SERVICE CMNT-IMP: NORMAL
SERVICE CMNT-IMP: NORMAL
SPECIMEN DESCRIPTION: NORMAL
SPECIMEN DESCRIPTION: NORMAL

## 2024-05-16 NOTE — PROGRESS NOTES
[facility] ([facility]) Inpatient Discharge Summary  YULY MCCABE  5/16/2024        Genie Wisdom  8/27/1923  [id number]    Date & Time of Discharge: 5/15/2024  2:15 PM       Services provided:  Screenings: ITSS    Screen Results (If any):      ITSS:  Date Screen Completed: 05/14/2024  Patient's score= 0 for PTSD risk. ( ? 2 is positive for PTSD risk. )  Patient's score= 0 for depression risk.  ( ? 2 is positive for Depression risk )      Discharge Recommendations:  No outpatient mental health services recommended      The therapist may be reached through the [facility] offices at [phone].

## 2024-05-26 NOTE — PROGRESS NOTES
Physician Progress Note      PATIENT:               RASHAAD GODFREY #:                  [id number]  :                       1923  ADMIT DATE:       2024 12:37 PM  DISCH DATE:        5/15/2024 2:15 PM  RESPONDING  PROVIDER #:        ROHAN EVANS DO        QUERY TEXT:    Stage of Chronic Kidney Disease: Please provide further specificity, if known.    Clinical indicators include: bun, creatinine, chronic kidney disease, probnp  Options provided:  -- Chronic kidney disease stage 1  -- Chronic kidney disease stage 2  -- Chronic kidney disease stage 3  -- Chronic kidney disease stage 3a  -- Chronic kidney disease stage 3b  -- Chronic kidney disease stage 4  -- Chronic kidney disease stage 5  -- Chronic kidney disease stage 5, requiring dialysis  -- End stage renal disease  -- Other - I will add my own diagnosis  -- Disagree - Not applicable / Not valid  -- Disagree - Clinically Unable to determine / Unknown        PROVIDER RESPONSE TEXT:    Provider was unable to determine a response for this query.  Does not have history of CKD      Electronically signed by:  ROHAN EVANS DO 2024 6:07 AM

## 2024-06-03 NOTE — PROGRESS NOTES
to Clinical Documentation Reviewer    PROVIDER RESPONSE TEXT:    This patient has traumatic rhabdomyolysis.    Query created by: Evelina Guerrero on 6/3/2024 2:34 PM      Electronically signed by:  VINICIO ROBLEDO 6/3/2024 3:04 PM

## 2024-06-10 ENCOUNTER — OFFICE VISIT (OUTPATIENT)
Dept: ORTHOPEDIC SURGERY | Age: 89
End: 2024-06-10
Payer: MEDICARE

## 2024-06-10 VITALS — HEIGHT: 59 IN | BODY MASS INDEX: 27.82 KG/M2 | WEIGHT: 138 LBS
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
## 2024-06-10 DIAGNOSIS — R52 PAIN: Primary | ICD-10-CM
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
## 2024-06-10 PROCEDURE — 1123F ACP DISCUSS/DSCN MKR DOCD: CPT | Performed by: STUDENT IN AN ORGANIZED HEALTH CARE EDUCATION/TRAINING PROGRAM

## 2024-06-10 PROCEDURE — G8427 DOCREV CUR MEDS BY ELIG CLIN: HCPCS | Performed by: STUDENT IN AN ORGANIZED HEALTH CARE EDUCATION/TRAINING PROGRAM

## 2024-06-10 PROCEDURE — G8417 CALC BMI ABV UP PARAM F/U: HCPCS | Performed by: STUDENT IN AN ORGANIZED HEALTH CARE EDUCATION/TRAINING PROGRAM

## 2024-06-10 PROCEDURE — 1090F PRES/ABSN URINE INCON ASSESS: CPT | Performed by: STUDENT IN AN ORGANIZED HEALTH CARE EDUCATION/TRAINING PROGRAM

## 2024-06-10 PROCEDURE — 99213 OFFICE O/P EST LOW 20 MIN: CPT | Performed by: STUDENT IN AN ORGANIZED HEALTH CARE EDUCATION/TRAINING PROGRAM

## 2024-06-10 PROCEDURE — 1036F TOBACCO NON-USER: CPT | Performed by: STUDENT IN AN ORGANIZED HEALTH CARE EDUCATION/TRAINING PROGRAM

## 2024-06-10 PROCEDURE — 1111F DSCHRG MED/CURRENT MED MERGE: CPT | Performed by: STUDENT IN AN ORGANIZED HEALTH CARE EDUCATION/TRAINING PROGRAM
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
